# Patient Record
Sex: FEMALE | Race: ASIAN | NOT HISPANIC OR LATINO
[De-identification: names, ages, dates, MRNs, and addresses within clinical notes are randomized per-mention and may not be internally consistent; named-entity substitution may affect disease eponyms.]

---

## 2019-09-09 ENCOUNTER — ASOB RESULT (OUTPATIENT)
Age: 29
End: 2019-09-09

## 2019-09-09 ENCOUNTER — APPOINTMENT (OUTPATIENT)
Dept: OBGYN | Facility: CLINIC | Age: 29
End: 2019-09-09
Payer: MEDICAID

## 2019-09-09 VITALS
SYSTOLIC BLOOD PRESSURE: 116 MMHG | HEIGHT: 64 IN | DIASTOLIC BLOOD PRESSURE: 75 MMHG | BODY MASS INDEX: 24.59 KG/M2 | HEART RATE: 106 BPM | WEIGHT: 144 LBS

## 2019-09-09 DIAGNOSIS — Z82.49 FAMILY HISTORY OF ISCHEMIC HEART DISEASE AND OTHER DISEASES OF THE CIRCULATORY SYSTEM: ICD-10-CM

## 2019-09-09 DIAGNOSIS — Z83.3 FAMILY HISTORY OF DIABETES MELLITUS: ICD-10-CM

## 2019-09-09 DIAGNOSIS — Z92.89 PERSONAL HISTORY OF OTHER MEDICAL TREATMENT: ICD-10-CM

## 2019-09-09 DIAGNOSIS — Z87.59 PERSONAL HISTORY OF OTHER COMPLICATIONS OF PREGNANCY, CHILDBIRTH AND THE PUERPERIUM: ICD-10-CM

## 2019-09-09 PROCEDURE — 76805 OB US >/= 14 WKS SNGL FETUS: CPT

## 2019-09-09 PROCEDURE — 0501F PRENATAL FLOW SHEET: CPT

## 2019-09-10 ENCOUNTER — NON-APPOINTMENT (OUTPATIENT)
Age: 29
End: 2019-09-10

## 2019-09-10 PROBLEM — Z87.59 HISTORY OF SPONTANEOUS ABORTION: Status: RESOLVED | Noted: 2019-09-10 | Resolved: 2019-09-10

## 2019-09-10 PROBLEM — Z92.89 HISTORY OF BLOOD TRANSFUSION: Status: RESOLVED | Noted: 2019-09-10 | Resolved: 2019-09-10

## 2019-09-10 PROBLEM — Z82.49 FAMILY HISTORY OF HYPERTENSION: Status: ACTIVE | Noted: 2019-09-10

## 2019-09-10 PROBLEM — Z83.3 FAMILY HISTORY OF DIABETES MELLITUS: Status: ACTIVE | Noted: 2019-09-10

## 2019-09-10 LAB — BACTERIA UR CULT: NORMAL

## 2019-09-26 ENCOUNTER — APPOINTMENT (OUTPATIENT)
Dept: ANTEPARTUM | Facility: CLINIC | Age: 29
End: 2019-09-26
Payer: MEDICAID

## 2019-09-26 ENCOUNTER — ASOB RESULT (OUTPATIENT)
Age: 29
End: 2019-09-26

## 2019-09-26 ENCOUNTER — OTHER (OUTPATIENT)
Age: 29
End: 2019-09-26

## 2019-09-26 PROCEDURE — 76811 OB US DETAILED SNGL FETUS: CPT

## 2019-10-07 ENCOUNTER — APPOINTMENT (OUTPATIENT)
Dept: OBGYN | Facility: CLINIC | Age: 29
End: 2019-10-07

## 2019-10-09 ENCOUNTER — APPOINTMENT (OUTPATIENT)
Dept: OBGYN | Facility: CLINIC | Age: 29
End: 2019-10-09
Payer: MEDICAID

## 2019-10-09 VITALS
WEIGHT: 147.38 LBS | HEIGHT: 64 IN | SYSTOLIC BLOOD PRESSURE: 121 MMHG | BODY MASS INDEX: 25.16 KG/M2 | DIASTOLIC BLOOD PRESSURE: 75 MMHG

## 2019-10-09 PROCEDURE — 0502F SUBSEQUENT PRENATAL CARE: CPT

## 2019-10-10 ENCOUNTER — NON-APPOINTMENT (OUTPATIENT)
Age: 29
End: 2019-10-10

## 2019-10-30 ENCOUNTER — MEDICATION RENEWAL (OUTPATIENT)
Age: 29
End: 2019-10-30

## 2019-10-31 ENCOUNTER — MEDICATION RENEWAL (OUTPATIENT)
Age: 29
End: 2019-10-31

## 2019-11-04 ENCOUNTER — NON-APPOINTMENT (OUTPATIENT)
Age: 29
End: 2019-11-04

## 2019-11-04 ENCOUNTER — APPOINTMENT (OUTPATIENT)
Dept: OBGYN | Facility: CLINIC | Age: 29
End: 2019-11-04
Payer: MEDICAID

## 2019-11-04 VITALS
WEIGHT: 152 LBS | BODY MASS INDEX: 25.95 KG/M2 | DIASTOLIC BLOOD PRESSURE: 72 MMHG | SYSTOLIC BLOOD PRESSURE: 120 MMHG | HEIGHT: 64 IN

## 2019-11-04 DIAGNOSIS — Z34.92 ENCOUNTER FOR SUPERVISION OF NORMAL PREGNANCY, UNSPECIFIED, SECOND TRIMESTER: ICD-10-CM

## 2019-11-04 PROCEDURE — 0502F SUBSEQUENT PRENATAL CARE: CPT

## 2019-11-05 LAB
BASOPHILS # BLD AUTO: 0.03 K/UL
BASOPHILS NFR BLD AUTO: 0.3 %
EOSINOPHIL # BLD AUTO: 0.12 K/UL
EOSINOPHIL NFR BLD AUTO: 1.1 %
GLUCOSE 1H P 50 G GLC PO SERPL-MCNC: 114 MG/DL
HCT VFR BLD CALC: 31.5 %
HGB BLD-MCNC: 9.6 G/DL
IMM GRANULOCYTES NFR BLD AUTO: 1.2 %
LYMPHOCYTES # BLD AUTO: 1.39 K/UL
LYMPHOCYTES NFR BLD AUTO: 13 %
MAN DIFF?: NORMAL
MCHC RBC-ENTMCNC: 26.5 PG
MCHC RBC-ENTMCNC: 30.5 GM/DL
MCV RBC AUTO: 87 FL
MONOCYTES # BLD AUTO: 0.69 K/UL
MONOCYTES NFR BLD AUTO: 6.5 %
NEUTROPHILS # BLD AUTO: 8.3 K/UL
NEUTROPHILS NFR BLD AUTO: 77.9 %
PLATELET # BLD AUTO: 228 K/UL
RBC # BLD: 3.62 M/UL
RBC # FLD: 13.2 %
T PALLIDUM AB SER QL IA: NEGATIVE
WBC # FLD AUTO: 10.66 K/UL

## 2019-11-08 LAB
ABO + RH PNL BLD: NORMAL
BLD GP AB SCN SERPL QL: NORMAL

## 2019-11-25 ENCOUNTER — APPOINTMENT (OUTPATIENT)
Dept: OBGYN | Facility: CLINIC | Age: 29
End: 2019-11-25
Payer: MEDICAID

## 2019-11-25 VITALS
BODY MASS INDEX: 27.83 KG/M2 | SYSTOLIC BLOOD PRESSURE: 128 MMHG | DIASTOLIC BLOOD PRESSURE: 79 MMHG | HEIGHT: 64 IN | WEIGHT: 163 LBS

## 2019-11-25 DIAGNOSIS — Z34.93 ENCOUNTER FOR SUPERVISION OF NORMAL PREGNANCY, UNSPECIFIED, THIRD TRIMESTER: ICD-10-CM

## 2019-11-25 PROCEDURE — 0502F SUBSEQUENT PRENATAL CARE: CPT

## 2019-12-02 ENCOUNTER — OTHER (OUTPATIENT)
Age: 29
End: 2019-12-02

## 2019-12-13 ENCOUNTER — ASOB RESULT (OUTPATIENT)
Age: 29
End: 2019-12-13

## 2019-12-13 ENCOUNTER — APPOINTMENT (OUTPATIENT)
Dept: OBGYN | Facility: CLINIC | Age: 29
End: 2019-12-13
Payer: MEDICAID

## 2019-12-13 ENCOUNTER — APPOINTMENT (OUTPATIENT)
Dept: ANTEPARTUM | Facility: CLINIC | Age: 29
End: 2019-12-13
Payer: MEDICAID

## 2019-12-13 ENCOUNTER — NON-APPOINTMENT (OUTPATIENT)
Age: 29
End: 2019-12-13

## 2019-12-13 VITALS
DIASTOLIC BLOOD PRESSURE: 73 MMHG | BODY MASS INDEX: 26.67 KG/M2 | SYSTOLIC BLOOD PRESSURE: 122 MMHG | HEIGHT: 64 IN | WEIGHT: 156.25 LBS

## 2019-12-13 PROCEDURE — 0502F SUBSEQUENT PRENATAL CARE: CPT

## 2019-12-13 PROCEDURE — 76816 OB US FOLLOW-UP PER FETUS: CPT

## 2019-12-13 PROCEDURE — 76819 FETAL BIOPHYS PROFIL W/O NST: CPT

## 2019-12-19 ENCOUNTER — OTHER (OUTPATIENT)
Age: 29
End: 2019-12-19

## 2019-12-20 ENCOUNTER — OUTPATIENT (OUTPATIENT)
Dept: INPATIENT UNIT | Facility: HOSPITAL | Age: 29
LOS: 1 days | Discharge: HOME | End: 2019-12-20
Payer: MEDICAID

## 2019-12-20 VITALS
SYSTOLIC BLOOD PRESSURE: 124 MMHG | DIASTOLIC BLOOD PRESSURE: 66 MMHG | RESPIRATION RATE: 17 BRPM | HEART RATE: 92 BPM | TEMPERATURE: 98 F

## 2019-12-20 DIAGNOSIS — Z98.890 OTHER SPECIFIED POSTPROCEDURAL STATES: Chronic | ICD-10-CM

## 2019-12-20 DIAGNOSIS — Z3A.00 WEEKS OF GESTATION OF PREGNANCY NOT SPECIFIED: ICD-10-CM

## 2019-12-20 DIAGNOSIS — O26.899 OTHER SPECIFIED PREGNANCY RELATED CONDITIONS, UNSPECIFIED TRIMESTER: ICD-10-CM

## 2019-12-20 PROCEDURE — 59025 FETAL NON-STRESS TEST: CPT | Mod: 26

## 2019-12-20 NOTE — OB RN TRIAGE NOTE - PMH
Anemia, unspecified type  was On iron pill  History of blood transfusion  S/P PPH 2018, 2 units PRBC as per the  pateint from the torn artery   (normal spontaneous vaginal delivery)  2018 FT F 7lbs, PPH and S/P 2 units PRBC   (normal spontaneous vaginal delivery)  10/3/2014 FT F 6#14   (normal spontaneous vaginal delivery)  3/9/2013 FT F 5#9

## 2019-12-20 NOTE — OB RN TRIAGE NOTE - CHIEF COMPLAINT QUOTE
" I have swelling in my hands and face, and headache from last night" " I have swelling in my hands, feet, and face, and headache from last night"

## 2019-12-20 NOTE — OB RN TRIAGE NOTE - PAIN RATING/NUMBER SCALE (0-10): REST
Dx: Ovarian Cyst, Ca-125=9    Met with Kayli and her significant other Adonay. Discussed her distress thermometer which she rates at 5-6 out of 10. Currently she is self employed as a  & has 3 children. Adonay has 4 children. Together they are worried about finances.     Physically she reports trouble with UTI's over the past year, fatigue as of late, & GI issues over the past year (IBS). She states that she has been tested for celiac disease and was negative. She also has had sleep issues r/t her worry over her current situation. Empathy given.     Family & social hx updated. She then met with Dr. Perdomo.  
6

## 2019-12-21 VITALS — SYSTOLIC BLOOD PRESSURE: 115 MMHG | HEART RATE: 90 BPM | DIASTOLIC BLOOD PRESSURE: 69 MMHG

## 2019-12-21 LAB
ALBUMIN SERPL ELPH-MCNC: 3.2 G/DL — LOW (ref 3.3–5)
ALP SERPL-CCNC: 83 U/L — SIGNIFICANT CHANGE UP (ref 40–120)
ALT FLD-CCNC: 7 U/L — SIGNIFICANT CHANGE UP (ref 4–33)
AMORPH CRY # UR COMP ASSIST: SIGNIFICANT CHANGE UP (ref 0–0)
ANION GAP SERPL CALC-SCNC: 10 MMO/L — SIGNIFICANT CHANGE UP (ref 7–14)
APPEARANCE UR: SIGNIFICANT CHANGE UP
APTT BLD: 28.4 SEC — SIGNIFICANT CHANGE UP (ref 27.5–36.3)
AST SERPL-CCNC: 12 U/L — SIGNIFICANT CHANGE UP (ref 4–32)
BACTERIA # UR AUTO: SIGNIFICANT CHANGE UP
BASOPHILS # BLD AUTO: 0.03 K/UL — SIGNIFICANT CHANGE UP (ref 0–0.2)
BASOPHILS NFR BLD AUTO: 0.3 % — SIGNIFICANT CHANGE UP (ref 0–2)
BILIRUB SERPL-MCNC: 0.4 MG/DL — SIGNIFICANT CHANGE UP (ref 0.2–1.2)
BILIRUB UR-MCNC: NEGATIVE — SIGNIFICANT CHANGE UP
BLOOD UR QL VISUAL: NEGATIVE — SIGNIFICANT CHANGE UP
BUN SERPL-MCNC: 6 MG/DL — LOW (ref 7–23)
CALCIUM SERPL-MCNC: 9.3 MG/DL — SIGNIFICANT CHANGE UP (ref 8.4–10.5)
CHLORIDE SERPL-SCNC: 104 MMOL/L — SIGNIFICANT CHANGE UP (ref 98–107)
CO2 SERPL-SCNC: 20 MMOL/L — LOW (ref 22–31)
COLOR SPEC: SIGNIFICANT CHANGE UP
CREAT ?TM UR-MCNC: 51.7 MG/DL — SIGNIFICANT CHANGE UP
CREAT SERPL-MCNC: 0.37 MG/DL — LOW (ref 0.5–1.3)
EOSINOPHIL # BLD AUTO: 0.29 K/UL — SIGNIFICANT CHANGE UP (ref 0–0.5)
EOSINOPHIL NFR BLD AUTO: 2.5 % — SIGNIFICANT CHANGE UP (ref 0–6)
FIBRINOGEN PPP-MCNC: 652 MG/DL — HIGH (ref 350–510)
GLUCOSE SERPL-MCNC: 91 MG/DL — SIGNIFICANT CHANGE UP (ref 70–99)
GLUCOSE UR-MCNC: NEGATIVE — SIGNIFICANT CHANGE UP
HCT VFR BLD CALC: 29.8 % — LOW (ref 34.5–45)
HGB BLD-MCNC: 9.4 G/DL — LOW (ref 11.5–15.5)
IMM GRANULOCYTES NFR BLD AUTO: 1.1 % — SIGNIFICANT CHANGE UP (ref 0–1.5)
INR BLD: 1 — SIGNIFICANT CHANGE UP (ref 0.88–1.17)
KETONES UR-MCNC: NEGATIVE — SIGNIFICANT CHANGE UP
LDH SERPL L TO P-CCNC: 127 U/L — LOW (ref 135–225)
LEUKOCYTE ESTERASE UR-ACNC: NEGATIVE — SIGNIFICANT CHANGE UP
LYMPHOCYTES # BLD AUTO: 1.68 K/UL — SIGNIFICANT CHANGE UP (ref 1–3.3)
LYMPHOCYTES # BLD AUTO: 14.7 % — SIGNIFICANT CHANGE UP (ref 13–44)
MCHC RBC-ENTMCNC: 25.2 PG — LOW (ref 27–34)
MCHC RBC-ENTMCNC: 31.5 % — LOW (ref 32–36)
MCV RBC AUTO: 79.9 FL — LOW (ref 80–100)
MONOCYTES # BLD AUTO: 0.91 K/UL — HIGH (ref 0–0.9)
MONOCYTES NFR BLD AUTO: 8 % — SIGNIFICANT CHANGE UP (ref 2–14)
NEUTROPHILS # BLD AUTO: 8.36 K/UL — HIGH (ref 1.8–7.4)
NEUTROPHILS NFR BLD AUTO: 73.4 % — SIGNIFICANT CHANGE UP (ref 43–77)
NITRITE UR-MCNC: NEGATIVE — SIGNIFICANT CHANGE UP
NRBC # FLD: 0 K/UL — SIGNIFICANT CHANGE UP (ref 0–0)
PH UR: 6.5 — SIGNIFICANT CHANGE UP (ref 5–8)
PLATELET # BLD AUTO: 228 K/UL — SIGNIFICANT CHANGE UP (ref 150–400)
PMV BLD: 10.1 FL — SIGNIFICANT CHANGE UP (ref 7–13)
POTASSIUM SERPL-MCNC: 4.3 MMOL/L — SIGNIFICANT CHANGE UP (ref 3.5–5.3)
POTASSIUM SERPL-SCNC: 4.3 MMOL/L — SIGNIFICANT CHANGE UP (ref 3.5–5.3)
PROT SERPL-MCNC: 6.7 G/DL — SIGNIFICANT CHANGE UP (ref 6–8.3)
PROT UR-MCNC: 12.4 MG/DL — SIGNIFICANT CHANGE UP
PROT UR-MCNC: NEGATIVE — SIGNIFICANT CHANGE UP
PROTHROM AB SERPL-ACNC: 11.1 SEC — SIGNIFICANT CHANGE UP (ref 9.8–13.1)
RBC # BLD: 3.73 M/UL — LOW (ref 3.8–5.2)
RBC # FLD: 14.7 % — HIGH (ref 10.3–14.5)
RBC CASTS # UR COMP ASSIST: SIGNIFICANT CHANGE UP (ref 0–?)
SODIUM SERPL-SCNC: 134 MMOL/L — LOW (ref 135–145)
SP GR SPEC: 1.01 — SIGNIFICANT CHANGE UP (ref 1–1.04)
SQUAMOUS # UR AUTO: SIGNIFICANT CHANGE UP
URATE SERPL-MCNC: 3.1 MG/DL — SIGNIFICANT CHANGE UP (ref 2.5–7)
UROBILINOGEN FLD QL: NORMAL — SIGNIFICANT CHANGE UP
WBC # BLD: 11.4 K/UL — HIGH (ref 3.8–10.5)
WBC # FLD AUTO: 11.4 K/UL — HIGH (ref 3.8–10.5)
WBC UR QL: SIGNIFICANT CHANGE UP (ref 0–?)

## 2019-12-21 RX ORDER — ACETAMINOPHEN 500 MG
975 TABLET ORAL ONCE
Refills: 0 | Status: COMPLETED | OUTPATIENT
Start: 2019-12-21 | End: 2019-12-21

## 2019-12-21 RX ADMIN — Medication 975 MILLIGRAM(S): at 00:20

## 2019-12-21 NOTE — OB PROVIDER TRIAGE NOTE - HISTORY OF PRESENT ILLNESS
30 yo  female,  33 3/7 weeks with complaints of headache, right sided facial, hand, and feet swelling. Pt denies taking any pain medication. Reports good fetal movements. Denies hx of migraines or elevated bp. Denies pain, lof, vaginal bleeding, visual disturbances, epigastric pain. Denies sob or chest pain. pt states that she had a long day yesterday and was doing a lot of running around.    Current a/p complications: Denies     Allergies: ampicillin- hives  Medications: folic acid, unisom, vit b 12   PMH: Anemia  PSH: D&C  x 1  OB/GYN:  (normal spontaneous vaginal delivery)  2017 FT F 6 lbs7oz, PPH and S/P 2 units PRBC   (normal spontaneous vaginal delivery)  10/3/2014 FT F 6#1   (normal spontaneous vaginal delivery)  3/9/2013 FT F 5#9  sab x 1 w/ d&c  Social: Denies   Psychosocial: Denies

## 2019-12-21 NOTE — OB PROVIDER TRIAGE NOTE - NSHPLABSRESULTS_GEN_ALL_CORE
CBC Full  -  ( 21 Dec 2019 00:30 )  WBC Count : 11.40 K/uL  RBC Count : 3.73 M/uL  Hemoglobin : 9.4 g/dL  Hematocrit : 29.8 %  Platelet Count - Automated : 228 K/uL  Mean Cell Volume : 79.9 fL  Mean Cell Hemoglobin : 25.2 pg  Mean Cell Hemoglobin Concentration : 31.5 %  Auto Neutrophil # : 8.36 K/uL  Auto Lymphocyte # : 1.68 K/uL  Auto Monocyte # : 0.91 K/uL  Auto Eosinophil # : 0.29 K/uL  Auto Basophil # : 0.03 K/uL  Auto Neutrophil % : 73.4 %  Auto Lymphocyte % : 14.7 %  Auto Monocyte % : 8.0 %  Auto Eosinophil % : 2.5 %  Auto Basophil % : 0.3 %        134<L>  |  104  |  6<L>  ----------------------------<  91  4.3   |  20<L>  |  0.37<L>    Ca    9.3      21 Dec 2019 00:30    TPro  6.7  /  Alb  3.2<L>  /  TBili  0.4  /  DBili  x   /  AST  12  /  ALT  7   /  AlkPhos  83  12-    uric acid 3.1    ldh 127     PT/INR - ( 21 Dec 2019 00:30 )   PT: 11.1 SEC;   INR: 1.00          PTT - ( 21 Dec 2019 00:30 )  PTT:28.4 SEC    fibrinogen 652     Urinalysis Basic - ( 21 Dec 2019 00:30 )    Color: LIGHT YELLOW / Appearance: Lt TURBID / S.015 / pH: 6.5  Gluc: NEGATIVE / Ketone: NEGATIVE  / Bili: NEGATIVE / Urobili: NORMAL   Blood: NEGATIVE / Protein: NEGATIVE / Nitrite: NEGATIVE   Leuk Esterase: NEGATIVE / RBC: 0-2 / WBC 3-5   Sq Epi: FEW / Non Sq Epi: x / Bacteria: FEW    pcr 0.24

## 2019-12-21 NOTE — OB PROVIDER TRIAGE NOTE - NS_OBGYNHISTORY_OBGYN_ALL_OB_FT
OB/GYN:  (normal spontaneous vaginal delivery)  2017 FT F 6 lbs7oz, PPH and S/P 2 units PRBC   (normal spontaneous vaginal delivery)  10/3/2014 FT F 6#1   (normal spontaneous vaginal delivery)  3/9/2013 FT F 5#9  sab x 1 w/ d&c

## 2019-12-21 NOTE — OB PROVIDER TRIAGE NOTE - NSHPPHYSICALEXAM_GEN_ALL_CORE
Vital Signs Last 24 Hrs  T(C): 36.6 (20 Dec 2019 23:54), Max: 36.8 (20 Dec 2019 21:36)  T(F): 97.88 (20 Dec 2019 23:54), Max: 98.2 (20 Dec 2019 21:36)  HR: 84 (21 Dec 2019 01:53) (79 - 92)  BP: 119/71 (21 Dec 2019 01:53) (110/64 - 124/66)  RR: 17 (20 Dec 2019 21:36) (17 - 17)      Abdomen soft, nontender  skin intact, warm and dry, no swelling noted     Cat 1 tracing   no ctx by toco     TAS cephalic presentation, anterior placenta, gracie 10.01 cm, bpp 8/8     hellp labs pending     serial bps

## 2019-12-21 NOTE — OB PROVIDER TRIAGE NOTE - NSOBPROVIDERNOTE_OBGYN_ALL_OB_FT
No evidence of preeclampsia   labs wnl  pt reports headache has resolved     D/w Dr. Mauricio   -Discharge home   -f/u in 1 week   -fetal kick count reviewed   -warning signs reviewed

## 2019-12-23 ENCOUNTER — APPOINTMENT (OUTPATIENT)
Dept: OBGYN | Facility: CLINIC | Age: 29
End: 2019-12-23

## 2019-12-23 VITALS — HEIGHT: 64 IN

## 2019-12-26 PROBLEM — D64.9 ANEMIA, UNSPECIFIED: Chronic | Status: ACTIVE | Noted: 2019-12-20

## 2019-12-26 PROBLEM — Z92.89 PERSONAL HISTORY OF OTHER MEDICAL TREATMENT: Chronic | Status: ACTIVE | Noted: 2019-12-20

## 2019-12-30 ENCOUNTER — TRANSCRIPTION ENCOUNTER (OUTPATIENT)
Age: 29
End: 2019-12-30

## 2019-12-30 ENCOUNTER — APPOINTMENT (OUTPATIENT)
Dept: OBGYN | Facility: CLINIC | Age: 29
End: 2019-12-30

## 2019-12-30 ENCOUNTER — INPATIENT (INPATIENT)
Facility: HOSPITAL | Age: 29
LOS: 1 days | Discharge: HOME | End: 2020-01-01
Attending: OBSTETRICS & GYNECOLOGY | Admitting: OBSTETRICS & GYNECOLOGY
Payer: MEDICAID

## 2019-12-30 VITALS
DIASTOLIC BLOOD PRESSURE: 69 MMHG | HEART RATE: 94 BPM | RESPIRATION RATE: 18 BRPM | SYSTOLIC BLOOD PRESSURE: 132 MMHG | TEMPERATURE: 99 F

## 2019-12-30 DIAGNOSIS — Z98.890 OTHER SPECIFIED POSTPROCEDURAL STATES: Chronic | ICD-10-CM

## 2019-12-30 DIAGNOSIS — Z3A.00 WEEKS OF GESTATION OF PREGNANCY NOT SPECIFIED: ICD-10-CM

## 2019-12-30 DIAGNOSIS — O26.899 OTHER SPECIFIED PREGNANCY RELATED CONDITIONS, UNSPECIFIED TRIMESTER: ICD-10-CM

## 2019-12-30 LAB
AMORPH CRY # UR COMP ASSIST: SIGNIFICANT CHANGE UP (ref 0–0)
APPEARANCE UR: SIGNIFICANT CHANGE UP
BACTERIA # UR AUTO: NEGATIVE — SIGNIFICANT CHANGE UP
BASOPHILS # BLD AUTO: 0.03 K/UL — SIGNIFICANT CHANGE UP (ref 0–0.2)
BASOPHILS NFR BLD AUTO: 0.2 % — SIGNIFICANT CHANGE UP (ref 0–2)
BILIRUB UR-MCNC: NEGATIVE — SIGNIFICANT CHANGE UP
BLD GP AB SCN SERPL QL: NEGATIVE — SIGNIFICANT CHANGE UP
BLOOD UR QL VISUAL: SIGNIFICANT CHANGE UP
COLOR SPEC: SIGNIFICANT CHANGE UP
EOSINOPHIL # BLD AUTO: 0.19 K/UL — SIGNIFICANT CHANGE UP (ref 0–0.5)
EOSINOPHIL NFR BLD AUTO: 1.5 % — SIGNIFICANT CHANGE UP (ref 0–6)
GLUCOSE UR-MCNC: NEGATIVE — SIGNIFICANT CHANGE UP
GRAN CASTS # UR COMP ASSIST: SIGNIFICANT CHANGE UP
HBV SURFACE AG SER-ACNC: NEGATIVE — SIGNIFICANT CHANGE UP
HCT VFR BLD CALC: 30.9 % — LOW (ref 34.5–45)
HGB BLD-MCNC: 9.6 G/DL — LOW (ref 11.5–15.5)
HIV COMBO RESULT: SIGNIFICANT CHANGE UP
HIV1+2 AB SPEC QL: SIGNIFICANT CHANGE UP
HYALINE CASTS # UR AUTO: NEGATIVE — SIGNIFICANT CHANGE UP
IMM GRANULOCYTES NFR BLD AUTO: 1.4 % — SIGNIFICANT CHANGE UP (ref 0–1.5)
KETONES UR-MCNC: NEGATIVE — SIGNIFICANT CHANGE UP
LEUKOCYTE ESTERASE UR-ACNC: NEGATIVE — SIGNIFICANT CHANGE UP
LYMPHOCYTES # BLD AUTO: 1.69 K/UL — SIGNIFICANT CHANGE UP (ref 1–3.3)
LYMPHOCYTES # BLD AUTO: 13.6 % — SIGNIFICANT CHANGE UP (ref 13–44)
MCHC RBC-ENTMCNC: 25.2 PG — LOW (ref 27–34)
MCHC RBC-ENTMCNC: 31.1 % — LOW (ref 32–36)
MCV RBC AUTO: 81.1 FL — SIGNIFICANT CHANGE UP (ref 80–100)
MONOCYTES # BLD AUTO: 0.97 K/UL — HIGH (ref 0–0.9)
MONOCYTES NFR BLD AUTO: 7.8 % — SIGNIFICANT CHANGE UP (ref 2–14)
NEUTROPHILS # BLD AUTO: 9.34 K/UL — HIGH (ref 1.8–7.4)
NEUTROPHILS NFR BLD AUTO: 75.5 % — SIGNIFICANT CHANGE UP (ref 43–77)
NITRITE UR-MCNC: NEGATIVE — SIGNIFICANT CHANGE UP
NRBC # FLD: 0 K/UL — SIGNIFICANT CHANGE UP (ref 0–0)
PH UR: 7 — SIGNIFICANT CHANGE UP (ref 5–8)
PLATELET # BLD AUTO: 226 K/UL — SIGNIFICANT CHANGE UP (ref 150–400)
PMV BLD: 9.8 FL — SIGNIFICANT CHANGE UP (ref 7–13)
PROT UR-MCNC: 10 — SIGNIFICANT CHANGE UP
RBC # BLD: 3.81 M/UL — SIGNIFICANT CHANGE UP (ref 3.8–5.2)
RBC # FLD: 15.3 % — HIGH (ref 10.3–14.5)
RBC CASTS # UR COMP ASSIST: HIGH (ref 0–?)
RENAL EPI CELLS # UR COMP ASSIST: SIGNIFICANT CHANGE UP
RH IG SCN BLD-IMP: POSITIVE — SIGNIFICANT CHANGE UP
RUBV IGG SER-ACNC: 3.9 INDEX — SIGNIFICANT CHANGE UP
RUBV IGG SER-IMP: POSITIVE — SIGNIFICANT CHANGE UP
SP GR SPEC: 1.02 — SIGNIFICANT CHANGE UP (ref 1–1.04)
SPECIMEN SOURCE: SIGNIFICANT CHANGE UP
SQUAMOUS # UR AUTO: SIGNIFICANT CHANGE UP
T PALLIDUM AB TITR SER: NEGATIVE — SIGNIFICANT CHANGE UP
UROBILINOGEN FLD QL: NORMAL — SIGNIFICANT CHANGE UP
WBC # BLD: 12.39 K/UL — HIGH (ref 3.8–10.5)
WBC # FLD AUTO: 12.39 K/UL — HIGH (ref 3.8–10.5)
WBC UR QL: SIGNIFICANT CHANGE UP (ref 0–?)

## 2019-12-30 PROCEDURE — 59400 OBSTETRICAL CARE: CPT | Mod: U7,UB,GC

## 2019-12-30 RX ORDER — DOXYLAMINE SUCCINATE 25 MG
1 TABLET ORAL
Qty: 0 | Refills: 0 | DISCHARGE

## 2019-12-30 RX ORDER — LANOLIN
1 OINTMENT (GRAM) TOPICAL EVERY 6 HOURS
Refills: 0 | Status: DISCONTINUED | OUTPATIENT
Start: 2019-12-30 | End: 2020-01-01

## 2019-12-30 RX ORDER — CEFAZOLIN SODIUM 1 G
1000 VIAL (EA) INJECTION EVERY 8 HOURS
Refills: 0 | Status: DISCONTINUED | OUTPATIENT
Start: 2019-12-30 | End: 2019-12-30

## 2019-12-30 RX ORDER — SODIUM CHLORIDE 9 MG/ML
1000 INJECTION, SOLUTION INTRAVENOUS
Refills: 0 | Status: DISCONTINUED | OUTPATIENT
Start: 2019-12-30 | End: 2019-12-30

## 2019-12-30 RX ORDER — DIPHENHYDRAMINE HCL 50 MG
25 CAPSULE ORAL EVERY 6 HOURS
Refills: 0 | Status: DISCONTINUED | OUTPATIENT
Start: 2019-12-30 | End: 2019-12-30

## 2019-12-30 RX ORDER — IBUPROFEN 200 MG
600 TABLET ORAL EVERY 6 HOURS
Refills: 0 | Status: COMPLETED | OUTPATIENT
Start: 2019-12-30 | End: 2020-11-27

## 2019-12-30 RX ORDER — FOLIC ACID 0.8 MG
1 TABLET ORAL
Qty: 0 | Refills: 0 | DISCHARGE

## 2019-12-30 RX ORDER — ACETAMINOPHEN 500 MG
975 TABLET ORAL
Refills: 0 | Status: DISCONTINUED | OUTPATIENT
Start: 2019-12-30 | End: 2020-01-01

## 2019-12-30 RX ORDER — VANCOMYCIN HCL 1 G
1000 VIAL (EA) INTRAVENOUS EVERY 12 HOURS
Refills: 0 | Status: DISCONTINUED | OUTPATIENT
Start: 2019-12-30 | End: 2019-12-30

## 2019-12-30 RX ORDER — DIBUCAINE 1 %
1 OINTMENT (GRAM) RECTAL EVERY 6 HOURS
Refills: 0 | Status: DISCONTINUED | OUTPATIENT
Start: 2019-12-30 | End: 2020-01-01

## 2019-12-30 RX ORDER — OXYTOCIN 10 UNIT/ML
333.33 VIAL (ML) INJECTION
Qty: 20 | Refills: 0 | Status: DISCONTINUED | OUTPATIENT
Start: 2019-12-30 | End: 2020-01-01

## 2019-12-30 RX ORDER — CEFAZOLIN SODIUM 1 G
VIAL (EA) INJECTION
Refills: 0 | Status: DISCONTINUED | OUTPATIENT
Start: 2019-12-30 | End: 2019-12-30

## 2019-12-30 RX ORDER — ONDANSETRON 8 MG/1
4 TABLET, FILM COATED ORAL ONCE
Refills: 0 | Status: COMPLETED | OUTPATIENT
Start: 2019-12-30 | End: 2019-12-30

## 2019-12-30 RX ORDER — TETANUS TOXOID, REDUCED DIPHTHERIA TOXOID AND ACELLULAR PERTUSSIS VACCINE, ADSORBED 5; 2.5; 8; 8; 2.5 [IU]/.5ML; [IU]/.5ML; UG/.5ML; UG/.5ML; UG/.5ML
0.5 SUSPENSION INTRAMUSCULAR ONCE
Refills: 0 | Status: DISCONTINUED | OUTPATIENT
Start: 2019-12-30 | End: 2020-01-01

## 2019-12-30 RX ORDER — OXYTOCIN 10 UNIT/ML
VIAL (ML) INJECTION
Qty: 20 | Refills: 0 | Status: DISCONTINUED | OUTPATIENT
Start: 2019-12-30 | End: 2020-01-01

## 2019-12-30 RX ORDER — VANCOMYCIN HCL 1 G
VIAL (EA) INTRAVENOUS
Refills: 0 | Status: DISCONTINUED | OUTPATIENT
Start: 2019-12-30 | End: 2019-12-30

## 2019-12-30 RX ORDER — IBUPROFEN 200 MG
600 TABLET ORAL EVERY 6 HOURS
Refills: 0 | Status: DISCONTINUED | OUTPATIENT
Start: 2019-12-30 | End: 2020-01-01

## 2019-12-30 RX ORDER — OXYCODONE HYDROCHLORIDE 5 MG/1
5 TABLET ORAL
Refills: 0 | Status: DISCONTINUED | OUTPATIENT
Start: 2019-12-30 | End: 2020-01-01

## 2019-12-30 RX ORDER — AER TRAVELER 0.5 G/1
1 SOLUTION RECTAL; TOPICAL EVERY 4 HOURS
Refills: 0 | Status: DISCONTINUED | OUTPATIENT
Start: 2019-12-30 | End: 2020-01-01

## 2019-12-30 RX ORDER — ACETAMINOPHEN 500 MG
3 TABLET ORAL
Qty: 0 | Refills: 0 | DISCHARGE
Start: 2019-12-30

## 2019-12-30 RX ORDER — DIPHENHYDRAMINE HCL 50 MG
25 CAPSULE ORAL EVERY 4 HOURS
Refills: 0 | Status: DISCONTINUED | OUTPATIENT
Start: 2019-12-30 | End: 2020-01-01

## 2019-12-30 RX ORDER — BENZOCAINE 10 %
1 GEL (GRAM) MUCOUS MEMBRANE EVERY 6 HOURS
Refills: 0 | Status: DISCONTINUED | OUTPATIENT
Start: 2019-12-30 | End: 2020-01-01

## 2019-12-30 RX ORDER — MAGNESIUM HYDROXIDE 400 MG/1
30 TABLET, CHEWABLE ORAL
Refills: 0 | Status: DISCONTINUED | OUTPATIENT
Start: 2019-12-30 | End: 2020-01-01

## 2019-12-30 RX ORDER — IBUPROFEN 200 MG
1 TABLET ORAL
Qty: 0 | Refills: 0 | DISCHARGE
Start: 2019-12-30

## 2019-12-30 RX ORDER — PRAMOXINE HYDROCHLORIDE 150 MG/15G
1 AEROSOL, FOAM RECTAL EVERY 4 HOURS
Refills: 0 | Status: DISCONTINUED | OUTPATIENT
Start: 2019-12-30 | End: 2020-01-01

## 2019-12-30 RX ORDER — VANCOMYCIN HCL 1 G
1000 VIAL (EA) INTRAVENOUS ONCE
Refills: 0 | Status: DISCONTINUED | OUTPATIENT
Start: 2019-12-30 | End: 2019-12-30

## 2019-12-30 RX ORDER — GLYCERIN ADULT
1 SUPPOSITORY, RECTAL RECTAL AT BEDTIME
Refills: 0 | Status: DISCONTINUED | OUTPATIENT
Start: 2019-12-30 | End: 2020-01-01

## 2019-12-30 RX ORDER — HYDROCORTISONE 1 %
1 OINTMENT (GRAM) TOPICAL EVERY 6 HOURS
Refills: 0 | Status: DISCONTINUED | OUTPATIENT
Start: 2019-12-30 | End: 2020-01-01

## 2019-12-30 RX ORDER — CEFAZOLIN SODIUM 1 G
2000 VIAL (EA) INJECTION ONCE
Refills: 0 | Status: COMPLETED | OUTPATIENT
Start: 2019-12-30 | End: 2019-12-30

## 2019-12-30 RX ORDER — OXYCODONE HYDROCHLORIDE 5 MG/1
5 TABLET ORAL ONCE
Refills: 0 | Status: DISCONTINUED | OUTPATIENT
Start: 2019-12-30 | End: 2020-01-01

## 2019-12-30 RX ORDER — SODIUM CHLORIDE 9 MG/ML
3 INJECTION INTRAMUSCULAR; INTRAVENOUS; SUBCUTANEOUS EVERY 8 HOURS
Refills: 0 | Status: DISCONTINUED | OUTPATIENT
Start: 2019-12-30 | End: 2020-01-01

## 2019-12-30 RX ORDER — KETOROLAC TROMETHAMINE 30 MG/ML
30 SYRINGE (ML) INJECTION ONCE
Refills: 0 | Status: DISCONTINUED | OUTPATIENT
Start: 2019-12-30 | End: 2019-12-30

## 2019-12-30 RX ORDER — SIMETHICONE 80 MG/1
80 TABLET, CHEWABLE ORAL EVERY 4 HOURS
Refills: 0 | Status: DISCONTINUED | OUTPATIENT
Start: 2019-12-30 | End: 2020-01-01

## 2019-12-30 RX ADMIN — Medication 1000 MILLIUNIT(S)/MIN: at 09:22

## 2019-12-30 RX ADMIN — SODIUM CHLORIDE 3 MILLILITER(S): 9 INJECTION INTRAMUSCULAR; INTRAVENOUS; SUBCUTANEOUS at 16:45

## 2019-12-30 RX ADMIN — SODIUM CHLORIDE 125 MILLILITER(S): 9 INJECTION, SOLUTION INTRAVENOUS at 03:27

## 2019-12-30 RX ADMIN — Medication 600 MILLIGRAM(S): at 23:32

## 2019-12-30 RX ADMIN — Medication 600 MILLIGRAM(S): at 18:46

## 2019-12-30 RX ADMIN — Medication 30 MILLIGRAM(S): at 10:29

## 2019-12-30 RX ADMIN — Medication 100 MILLIGRAM(S): at 03:39

## 2019-12-30 RX ADMIN — ONDANSETRON 4 MILLIGRAM(S): 8 TABLET, FILM COATED ORAL at 05:07

## 2019-12-30 RX ADMIN — Medication 25 MILLIGRAM(S): at 09:49

## 2019-12-30 RX ADMIN — Medication 12 MILLIGRAM(S): at 03:27

## 2019-12-30 NOTE — DISCHARGE NOTE OB - HOSPITAL COURSE
28 y/o P3 @ 34+ weeks admitted in  labor. Delivered viable male infant via , stable to NICU. Postpartum course uncomplicated.

## 2019-12-30 NOTE — CHART NOTE - NSCHARTNOTEFT_GEN_A_CORE
R2 Labor Note    Patient evaluated for progress  SVE 9/100/0, palpable bag   [place] q2-3m   /mod haydee/accels+/no decels    A/P: 29y P3 admitted for pre term labor   -Labor: Continue with expectant management. s/p BMZ for fetal lung maturity   -Fetus: cat I, continous EFM   -GBS unk, Vanc for GBS ppx   -Analgesia: epidural, effective    Esra Gisselle, PGY2  d/w Dr. Bolton

## 2019-12-30 NOTE — DISCHARGE NOTE OB - MATERIALS PROVIDED
Back To Sleep Handout/[facility]  Screening Program/Breastfeeding Guide and Packet/Birth Certificate Instructions/Vaccinations/[facility] Hearing Screen Program/Shaken Baby Prevention Handout/Tdap Vaccination (VIS Pub Date: 2012)/  Immunization Record/Breastfeeding Log/Breastfeeding Mother’s Support Group Information/Guide to Postpartum Care

## 2019-12-30 NOTE — OB NEONATOLOGY/PEDIATRICIAN DELIVERY SUMMARY - NSPEDSNEONOTESA_OBGYN_ALL_OB_FT
Peds called to delivery for prematurity. 34.5 weeker male born to a 29 yo  mother via . No maternal or prenatal complications. Mother received BMZ x1.  PNL negative HIV, Rubella & RPR pending. GBS unknown. ROM at time of delivery. APGARs 9/9. Required nCPAP 5/21% for grunting and retractions at 12 MOL. Baby transferred to NICU for further management.     Parents would like to breastfeed and bottle feed. Deferring Hepatitis B to PMD (Cristiano Holguin). Family would like Circumcision prior to discharge. Peds called to delivery for prematurity. 34.5 weeker male born to a 31 yo  mother via . No maternal or prenatal complications. Mother received BMZ x1.  PNL negative HIV, Rubella & RPR pending. GBS unknown. ROM at time of delivery. Baby emerged vigorous with spontaneous cry. DCC X 60 seconds.  W/D/S/S. APGARs 9/9. Required nCPAP 5/21% for grunting and retractions at 12 MOL. Baby transferred to NICU for further management.     Parents would like to breastfeed and bottle feed. Deferring Hepatitis B to PMD (Cristiano Holguin). Family would like Circumcision prior to discharge.

## 2019-12-30 NOTE — OB PROVIDER TRIAGE NOTE - HISTORY OF PRESENT ILLNESS
Dr. Bardales's pt. is a 28y/o EGA 34.5wks  pt. reports of painful contractions since 10pm every 5-7mins. Pain 10/10. Pt. denies LOF and VB. Pt. reports FM.

## 2019-12-30 NOTE — OB RN DELIVERY SUMMARY - NS_SEPSISRSKCALC_OBGYN_ALL_OB_FT
EOS calculated successfully. EOS Risk Factor: 0.5/1000 live births ([facility] national incidence); GA=34w5d; Temp=98.7; ROM=0.017; GBS='Unknown'; Antibiotics='No antibiotics or any antibiotics < 2 hrs prior to birth'

## 2019-12-30 NOTE — DISCHARGE NOTE OB - PATIENT PORTAL LINK FT
You can access the FollowMyHealth Patient Portal offered by [facility] by registering at the following website: [email or web address]. By joining [facility]’s FollowMyHealth portal, you will also be able to view your health information using other applications (apps) compatible with our system.

## 2019-12-30 NOTE — OB PROVIDER H&P - HISTORY OF PRESENT ILLNESS
Dr. Bardales's pt. is a 30y/o EGA 34.5wks  pt. reports of painful contractions since 10pm every 5-7mins. Pain 10/10. Pt. denies LOF and VB. Pt. reports FM.

## 2019-12-30 NOTE — DISCHARGE NOTE OB - CARE PROVIDER_API CALL
Morena Bardales (MD)  Obstetrics and Gynecology  [facility] [place] [address]  Phone: [phone]  Fax: [phone]  Follow Up Time:

## 2019-12-30 NOTE — OB RN TRIAGE NOTE - NS_TRIAGETIMEOF NOTIFICATION_OBGYN_ALL_OB_DT
Patient drowsy at this time, unable complete sentences. Answers questions inappropriately. Pain medication withheld, pain service aware and at bedside. Patient falling asleep during conversation, does awake to name. VSS- on 2L 02 NC.      Discussed case w 30-Dec-2019 02:52

## 2019-12-30 NOTE — OB PROVIDER TRIAGE NOTE - NSOBPROVIDERNOTE_OBGYN_ALL_OB_FT
Dr. Bardales's pt. is a 30y/o EGA 34.5wks  pt. reports of painful contractions since 10pm every 5-7mins. Pain 10/10. Pt. denies LOF and VB. Pt. reports FM.     AP: Denies  Medical Hx: Anemia  Surgical Hx: D&C   OBGYN Hx:   FT  3/9/2013 5-9  FT  10/3/2014 6-14  SABx1 with D&C   FT  2018 7-0 (PPH received 2units PRBC as per pt. for a "torn artery")   Meds: Unisom, B12, PNV  Allergies: Ampicillin-Rash    Assessment/Plan  SVE: /-2  TAS: Cephalic presentation  EFW: 2500gms by leopolds  FHR: 145bpm, moderate variability, accels, no decels   [place] every 6-7mins     Evidence of  labor. Discussed findings with Dr. Bolton.  -Admit to L&D  -Routine labs, UA, UC, and GBS ordered   -Betamethasone for fetal lung maturity  -Vancomycin for GBS unknown  -For epidural

## 2019-12-30 NOTE — OB PROVIDER TRIAGE NOTE - NSHPPHYSICALEXAM_GEN_ALL_CORE
SVE: 4/80/-2  TAS: Cephalic presentation  EFW: 2500gms by leopolds  FHR: 145bpm, moderate variability, accels, no decels   Chelsea every 6-7mins

## 2019-12-30 NOTE — OB RN DELIVERY SUMMARY - NS_ADMITLABOR_OBGYN_ALL_OB
[facility]  [address] [id number] [phone]            Abundio Beckett  [address]   [facility] [address]        September 11, 2018    Dear Abundio,    While refilling your prescription today, we noticed that you are due to have labs drawn.  We will refill your prescription for 30 days, but a follow-up appointment must be made before any additional refills can be approved.     Taking care of your health is important to us and we look forward to seeing you in the near future.  Please call us at [phone] or [phone] (or use [employer]) to schedule an appointment.     Please disregard this notice if you have already made an appointment.    Sincerely,        [facility]  
Yes

## 2019-12-30 NOTE — OB PROVIDER H&P - ASSESSMENT
Dr. Bardales's pt. is a 28y/o EGA 34.5wks  pt. reports of painful contractions since 10pm every 5-7mins. Pain 10/10. Pt. denies LOF and VB. Pt. reports FM.     AP: Denies  Medical Hx: Anemia  Surgical Hx: D&C   OBGYN Hx:   FT  3/9/2013 5-9  FT  10/3/2014 6-14  SABx1 with D&C   FT  2018 7-0 (PPH received 2units PRBC as per pt. for a "torn artery")   Meds: Unisom, B12, PNV  Allergies: Ampicillin-Rash    Assessment/Plan  SVE: /-2  TAS: Cephalic presentation  EFW: 2500gms by leopolds  FHR: 145bpm, moderate variability, accels, no decels   [place] every 6-7mins     Evidence of  labor. Discussed findings with Dr. Bolton.  -Admit to L&D  -Routine labs, UA, UC, and GBS ordered   -Betamethasone for fetal lung maturity  -Vancomycin for GBS unknown  -For epidural

## 2019-12-30 NOTE — DISCHARGE NOTE OB - CARE PLAN
Principal Discharge DX:	 (normal spontaneous vaginal delivery)  Goal:	recovery  Assessment and plan of treatment:	no change

## 2019-12-30 NOTE — CHART NOTE - NSCHARTNOTEFT_GEN_A_CORE
OB Attg  Patient examined, VE: 10/100/+1, membranes intact  Recently received additional medication and feeling comfortable.   Discussed AROM with next exam if feeling pressure. Discussed peds present at delivery and NICU admission.   Agustina Nice MD

## 2019-12-30 NOTE — CHART NOTE - NSCHARTNOTEFT_GEN_A_CORE
R1 OB Progress Note    Patient seen and evaluated at bedside.  Denies complaints.  Comfortable w/ epidural.      T(C): 36.7 (12-30-19 @ 04:00), Max: 37.1 (12-30-19 @ 02:46)  HR: 102 (12-30-19 @ 04:12) (94 - 131)  BP: 129/60 (12-30-19 @ 04:12) (122/55 - 149/85)  RR: 16 (12-30-19 @ 04:00) (16 - 18)  SpO2: 100% (12-30-19 @ 04:01) (100% - 100%)    SVE: 6/90/-3  EFM: 135, mod haydee, + accels, - decels BECKI  [place]:  irregular    A/P 29y P3 admitted for pre term labor   -Labor: Expectant management. Betamethasone for fetal lung maturity  -Fetus: BECKI  -GBS unk, Vanc for prophyylaxis  -Analgesia: epidural    Geovany Vernon PGY1  d/w

## 2019-12-31 LAB — BACTERIA UR CULT: SIGNIFICANT CHANGE UP

## 2019-12-31 RX ADMIN — SODIUM CHLORIDE 3 MILLILITER(S): 9 INJECTION INTRAMUSCULAR; INTRAVENOUS; SUBCUTANEOUS at 14:00

## 2019-12-31 RX ADMIN — Medication 600 MILLIGRAM(S): at 06:00

## 2019-12-31 RX ADMIN — Medication 600 MILLIGRAM(S): at 13:21

## 2019-12-31 RX ADMIN — Medication 600 MILLIGRAM(S): at 20:50

## 2019-12-31 RX ADMIN — Medication 600 MILLIGRAM(S): at 14:00

## 2019-12-31 RX ADMIN — Medication 600 MILLIGRAM(S): at 19:53

## 2019-12-31 NOTE — PROGRESS NOTE ADULT - SUBJECTIVE AND OBJECTIVE BOX
Anesthesia Post-op Note    POD#1 S/P vaginal delivery.    Patient out of room to NICU. As per FADIA Benson, patient is doing well.  OOBAA. Tolerating clears.  Pain is tolerable.  No residual anesthetic issues or complications noted.

## 2019-12-31 NOTE — PROGRESS NOTE ADULT - SUBJECTIVE AND OBJECTIVE BOX
S: Patient doing well. Minimal lochia. Pain controlled. Ambulating, doing well, no complaints    O: Vital Signs Last 24 Hrs  T(C): 37 (31 Dec 2019 06:56), Max: 37 (31 Dec 2019 06:56)  T(F): 98.6 (31 Dec 2019 06:56), Max: 98.6 (31 Dec 2019 06:56)  HR: 85 (31 Dec 2019 06:56) (82 - 90)  BP: 124/62 (31 Dec 2019 06:56) (124/62 - 130/62)  BP(mean): --  RR: 19 (31 Dec 2019 06:56) (17 - 19)  SpO2: 99% (31 Dec 2019 06:56) (99% - 100%)    Gen: NAD  Abd: soft, NT, ND, fundus firm below umbilicus  Lochia: moderate  Ext: no tenderness    Labs:                        9.6    12.39 )-----------( 226      ( 30 Dec 2019 03:20 )             30.9       A: 29y PPD# 1 s/p  doing well.    Plan:  Routine postpartum care  Discharge planning

## 2020-01-01 VITALS
SYSTOLIC BLOOD PRESSURE: 115 MMHG | HEART RATE: 73 BPM | RESPIRATION RATE: 18 BRPM | TEMPERATURE: 98 F | DIASTOLIC BLOOD PRESSURE: 66 MMHG | OXYGEN SATURATION: 99 %

## 2020-01-01 RX ADMIN — Medication 1 TABLET(S): at 12:25

## 2020-01-01 RX ADMIN — Medication 600 MILLIGRAM(S): at 12:25

## 2020-01-01 RX ADMIN — Medication 600 MILLIGRAM(S): at 07:40

## 2020-01-01 RX ADMIN — Medication 600 MILLIGRAM(S): at 06:54

## 2020-01-06 ENCOUNTER — APPOINTMENT (OUTPATIENT)
Dept: OBGYN | Facility: CLINIC | Age: 30
End: 2020-01-06

## 2020-01-13 ENCOUNTER — APPOINTMENT (OUTPATIENT)
Dept: OBGYN | Facility: CLINIC | Age: 30
End: 2020-01-13

## 2020-01-23 ENCOUNTER — APPOINTMENT (OUTPATIENT)
Dept: OBGYN | Facility: CLINIC | Age: 30
End: 2020-01-23

## 2020-01-24 NOTE — OB PROVIDER DELIVERY SUMMARY - NSPROVIDERDELIVERYNOTE_OBGYN_ALL_OB_FT
Viable male infant delivered via . Delivery imminent, decision made to rupture membranes. 1 dose of Betamethasone given prior. Peds called for delivery. Spontaneous cry, delayed cord clamping performed.

## 2020-01-30 NOTE — OB RN DELIVERY SUMMARY - NSVAGDELIVERYA_OBGYN_ALL_OB
Subjective:       Patient ID: Shanti Hartman is a 11 m.o. female.    Vitals:  height is 2' (0.61 m) and weight is 9.979 kg (22 lb). Her tympanic temperature is 98.8 °F (37.1 °C). Her pulse is 99. Her oxygen saturation is 98%.     Chief Complaint: Sinus Problem    11 month old female new to me presents for f/up. Seen over the weekend with 101 temp. Dx with ear infection and treated with amoxil. Reports fever is better, but concerned about chest congestion. Playing, just doesn't have much of an appetite. Dad dx with flu today.     Sinus Problem   This is a new problem. The current episode started in the past 7 days. The problem has been gradually worsening since onset. There has been no fever. Associated symptoms include congestion, coughing and sinus pressure. Pertinent negatives include no chills, diaphoresis, ear pain, headaches, shortness of breath, sneezing or sore throat. Past treatments include antibiotics. The treatment provided mild relief.       Constitution: Positive for fever (improved from weekend). Negative for appetite change, chills, sweating and fatigue.   HENT: Positive for congestion, postnasal drip and sinus pressure. Negative for ear pain, nosebleeds, foreign body in nose, sinus pain, sore throat, trouble swallowing and voice change.    Neck: Negative for painful lymph nodes.   Respiratory: Positive for cough and wheezing. Negative for sputum production, shortness of breath, stridor and asthma.    Gastrointestinal: Negative for abdominal pain, nausea, vomiting, constipation and diarrhea.   Musculoskeletal: Negative for muscle ache.   Skin: Negative for rash.   Allergic/Immunologic: Negative for asthma and sneezing.   Neurological: Negative for headaches and seizures.   Hematologic/Lymphatic: Negative for swollen lymph nodes.       Objective:      Physical Exam   Constitutional: She appears well-developed and well-nourished. She is active. No distress.   HENT:   Head: Normocephalic and atraumatic.  Anterior fontanelle is flat. No hematoma. No signs of injury.   Right Ear: External ear, pinna and canal normal.   Left Ear: Tympanic membrane, external ear, pinna and canal normal.   Nose: Nose normal. No rhinorrhea or nasal discharge. No signs of injury.   Mouth/Throat: Mucous membranes are moist. Oropharynx is clear.   Right ++ erythema, mild pink to left. Thick pnd noted.    Eyes: Red reflex is present bilaterally. Visual tracking is normal. Pupils are equal, round, and reactive to light. Conjunctivae and lids are normal. Right eye exhibits no discharge. Left eye exhibits no discharge. No scleral icterus.   Neck: Trachea normal and normal range of motion. Neck supple. No tenderness is present.   Cardiovascular: Normal rate and regular rhythm.   Pulmonary/Chest: Effort normal. No nasal flaring or stridor. No respiratory distress. She has no wheezes. She has rhonchi (bilateral throughout). She has no rales. She exhibits no retraction.   Abdominal: Soft. Bowel sounds are normal. She exhibits no distension. There is no tenderness.   Musculoskeletal: Normal range of motion. She exhibits no tenderness or deformity.   Lymphadenopathy:     She has no cervical adenopathy.   Neurological: She is alert. She has normal strength and normal reflexes. Suck normal.   Skin: Skin is warm, dry, not diaphoretic, not pale, no rash and not purpuric. Capillary refill takes less than 2 seconds. Turgor is normal. petechiaecyanosis  Nursing note and vitals reviewed.        Assessment:       1. Abnormal breath sounds    2. Acute bronchiolitis due to unspecified organism    3. Recurrent acute suppurative otitis media of right ear without spontaneous rupture of tympanic membrane        Plan:         1. Abnormal breath sounds  POST TX: completely clear throughout.   - albuterol nebulizer solution 2.5 mg    2. Acute bronchiolitis due to unspecified organism  Advised mom this is very likely post flu, but she is clearing well. Will not test  since we are 6 days in and taking all precautions.   - HME - OTHER  - albuterol (ACCUNEB) 0.63 mg/3 mL Nebu; Take 3 mLs (0.63 mg total) by nebulization every 6 (six) hours as needed. Rescue  Dispense: 30 vial; Refill: 0    3. Recurrent acute suppurative otitis media of right ear without spontaneous rupture of tympanic membrane  Advised will change a/b, seemingly getting worse as she is not sleeping or eating well. Pulling on ear in office.   - amoxicillin-clavulanate (AUGMENTIN) 600-42.9 mg/5 mL SusR; 3.5 ml po bid  Dispense: 75 mL; Refill: 0     Spontaneous

## 2020-02-05 ENCOUNTER — APPOINTMENT (OUTPATIENT)
Dept: OBGYN | Facility: CLINIC | Age: 30
End: 2020-02-05
Payer: MEDICAID

## 2020-02-05 VITALS
WEIGHT: 148 LBS | BODY MASS INDEX: 25.27 KG/M2 | HEART RATE: 62 BPM | HEIGHT: 64 IN | SYSTOLIC BLOOD PRESSURE: 115 MMHG | DIASTOLIC BLOOD PRESSURE: 67 MMHG

## 2020-02-05 PROCEDURE — 0503F POSTPARTUM CARE VISIT: CPT

## 2020-02-24 ENCOUNTER — APPOINTMENT (OUTPATIENT)
Dept: OBGYN | Facility: CLINIC | Age: 30
End: 2020-02-24

## 2020-03-27 ENCOUNTER — APPOINTMENT (OUTPATIENT)
Dept: DERMATOLOGY | Facility: CLINIC | Age: 30
End: 2020-03-27

## 2020-03-29 NOTE — HISTORY OF PRESENT ILLNESS
[Postpartum Follow Up] : postpartum follow up [Delivery Date: ___] : on [unfilled] [Male] : Delivery History: baby boy [Wt. ___] : weighing [unfilled] [Boy] : baby is a boy [Infant's Name ___] : [unfilled] [___ Lbs] : [unfilled] lbs [___ Oz] : [unfilled] oz [Circumcised] : circumcised [Living at Home] : is currently living at home [Bottle Feeding] : bottle feeding [Breastfeeding] : currently nursing [Intended Contraception] : Intended Contraception: [] : delivered by vaginal delivery [Back to Normal] : is back to normal in size [Mild] : mild vaginal bleeding [Normal] : the vagina was normal [Not Done] : Examination of breasts not done [Doing Well] : is doing well [No Sign of Infection] : is showing no signs of infection [None] : None [Excellent Pain Control] : has excellent pain control [Complications:___] : no complications [Rhogam] : Rhogam was not administered [Rubella Vaccine] : Rubella vaccine was not administered [Pertussis Vaccine] : Pertussis vaccine was not administered [BTL] : no tubal ligation [BF with Difficulty] : nursing without difficulty [Resumed Menses] : has not resumed her menses [Resumed [place]] : has not resumed intercourse [S/Sx PP Depression] : no signs/symptoms of postpartum depression [Erythema] : not erythematous [Cervix Sample Taken] : cervical sample not taken for a Pap smear

## 2020-04-30 NOTE — OB PROVIDER H&P - NSGENETICTESTING_OBGYN_ALL_OB
[de-identified] : Mr. Deolng is a 59 y/o male, with a previously diagnosed acute myeloid leukemia. A bone marrow biopsy from 7/3 revealed Acute myeloid leukemia (AML). FISH - report shows a population of aberrant myeloid blasts. He is currently being treated by Dr. Coelho for high risk AML with HIDAC consolidation chemotherapy. He was referred by Dr. Coelho for an initial consultation of a Haplo- transplant. \par \par The patient has a history of multiple blood clots - factor V Leiden runs in the family. He was diagnosed with sleep apnea, but refuses to use CPAP. He is a former smoker, 40 years. He also has a past medical history of PE, HTN, cardiomyopathy. 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 \par Upon admission, a TLC was placed in IR. Mr. Delong received IV fluid hydration, pain management, nutritional support, anxiolysis, antiemetics, and antiviral,  antifungal, antibacterial, GI, PCP and VOD prophylaxis. When his ANC dropped  below 1000, he was started on prophylactic Cefepime. Labs were monitored on a daily basis and he received transfusional support and electrolyte repletion as needed. \par \par While in patient, Mr. 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 \par Upon admission, a TLC was placed in IR. Mr. Delong received IV fluid hydration, pain management, nutritional support, anxiolysis, antiemetics, and antiviral, antifungal, antibacterial, GI, PCP and VOD prophylaxis. When his ANC dropped below 1000, he was started on prophylactic Cefepime. Labs were monitored on a daily basis and he received transfusional support and electrolyte repletion as needed. \par \par While in patient, Mr. 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 \par \par During admission, Mr. Delong had pancytopenia related to the high dose chemotherapy prep regimen. He also experienced neutropenic fevers. When he became febrile, blood and urine cultures were sent and a CXR was completed which were unremarkable. \par \par On 10/9/2019, following premedications, pt received 250 ml of allogeneic, related, haplo, fresh, mobilized HPC apheresis over 30-60 minutes. \par Cell counts as follows: \par \par Total MNCs (x10^8/kg) = 5.17 \par CD 34 cells (x10^6/kg) = 7.34 \par CD 3 Cells (x 10^7/kg) = 19.81 \par Cell viability (%) = 100 \par \par Pt tolerated infusion without any adverse reaction or complications. \par \par Engraftment was noted on 10/28/2019. Daily Zarxio was discontinued, as was Cefepime given negative cultures. Mr. Delong was discharged home to f/u at the [facility].  [de-identified] : On 4/17/20 telephone visit, day +191 of SCT today. Overall continues to feel well. Reports adequate PO intake and hydration. Ongoing SOB on exertion, waxes and wanes. Redness and discharge from eyes has resolved with abx eye gtts. Reports recurrent redness on chest and neck, admits he has not been compliant with steroid cream. Stable BLE edema, L>R which waxes and wanes. Ongoing insomnia despite Xanax 0.5mg qhs. Compliant with post transplant meds and restrictions. Currently taking FK 0.5mg daily and prednisone 10mg daily. Denies fever, chills, headache, dizziness, blurred vision, mucositis/odynophagia, chest pain, cough, nausea/vomiting, diarrhea/constipation, abdominal pain, dysuria, bleeding, pain \par \par Pt verbally consents to tele visit....was just hospitalized for presumed bacterial pna and CHF..covid neg 4 times....feeling better No, other reason

## 2022-01-13 NOTE — OB PROVIDER TRIAGE NOTE - NSPRENATALCARE_OBGYN_ALL_OB
1. Drink fluids  2. Tylenol as needs  3. Follow up in two weeks with Luis pabon bp.  She has lost 50 pounds with dieting and meds and may need adjustment of bp meds.  
Yes

## 2022-10-26 ENCOUNTER — ASOB RESULT (OUTPATIENT)
Age: 32
End: 2022-10-26

## 2022-10-26 ENCOUNTER — APPOINTMENT (OUTPATIENT)
Dept: OBGYN | Facility: CLINIC | Age: 32
End: 2022-10-26

## 2022-10-26 VITALS
DIASTOLIC BLOOD PRESSURE: 78 MMHG | WEIGHT: 143 LBS | SYSTOLIC BLOOD PRESSURE: 120 MMHG | HEART RATE: 96 BPM | HEIGHT: 64 IN | BODY MASS INDEX: 24.41 KG/M2

## 2022-10-26 DIAGNOSIS — O21.0 MILD HYPEREMESIS GRAVIDARUM: ICD-10-CM

## 2022-10-26 DIAGNOSIS — N91.2 AMENORRHEA, UNSPECIFIED: ICD-10-CM

## 2022-10-26 PROCEDURE — 99215 OFFICE O/P EST HI 40 MIN: CPT

## 2022-10-26 PROCEDURE — 76801 OB US < 14 WKS SINGLE FETUS: CPT

## 2022-10-26 RX ORDER — OMEGA-3/DHA/EPA/FISH OIL 1000 MG
1000 CAPSULE ORAL DAILY
Qty: 30 | Refills: 3 | Status: COMPLETED | COMMUNITY
Start: 2019-10-31 | End: 2022-10-26

## 2022-10-26 RX ORDER — DOXYLAMINE SUCCINATE 25 MG
25 TABLET ORAL
Refills: 0 | Status: COMPLETED | COMMUNITY
Start: 2019-09-09 | End: 2022-10-26

## 2022-10-26 RX ORDER — CHROMIUM 200 MCG
TABLET ORAL
Refills: 0 | Status: COMPLETED | COMMUNITY
End: 2022-10-26

## 2022-10-26 RX ORDER — OMEGA-3/DHA/EPA/FISH OIL 1000 MG
1000 CAPSULE ORAL DAILY
Qty: 90 | Refills: 3 | Status: COMPLETED | COMMUNITY
Start: 2019-12-13 | End: 2022-10-26

## 2022-10-26 RX ORDER — DOXYLAMINE SUCCINATE AND PYRIDOXINE HYDROCHLORIDE 20; 20 MG/1; MG/1
20-20 TABLET, EXTENDED RELEASE ORAL
Qty: 60 | Refills: 1 | Status: ACTIVE | COMMUNITY
Start: 2022-10-26 | End: 1900-01-01

## 2022-10-26 RX ORDER — DOXYLAMINE SUCCINATE 25 MG
TABLET ORAL
Refills: 0 | Status: COMPLETED | COMMUNITY
End: 2022-10-26

## 2022-10-27 ENCOUNTER — NON-APPOINTMENT (OUTPATIENT)
Age: 32
End: 2022-10-27

## 2022-10-28 LAB
ABO + RH PNL BLD: NORMAL
ALBUMIN SERPL ELPH-MCNC: 4.5 G/DL
ALP BLD-CCNC: 50 U/L
ALT SERPL-CCNC: 16 U/L
ANION GAP SERPL CALC-SCNC: 10 MMOL/L
AST SERPL-CCNC: 15 U/L
BACTERIA UR CULT: NORMAL
BASOPHILS # BLD AUTO: 0.03 K/UL
BASOPHILS NFR BLD AUTO: 0.2 %
BILIRUB SERPL-MCNC: 0.5 MG/DL
BLD GP AB SCN SERPL QL: NORMAL
BUN SERPL-MCNC: 14 MG/DL
C TRACH RRNA SPEC QL NAA+PROBE: NOT DETECTED
CALCIUM SERPL-MCNC: 9.5 MG/DL
CHLORIDE SERPL-SCNC: 102 MMOL/L
CO2 SERPL-SCNC: 25 MMOL/L
CREAT SERPL-MCNC: 0.53 MG/DL
EGFR: 125 ML/MIN/1.73M2
EOSINOPHIL # BLD AUTO: 0.21 K/UL
EOSINOPHIL NFR BLD AUTO: 1.7 %
ESTIMATED AVERAGE GLUCOSE: 103 MG/DL
GLUCOSE SERPL-MCNC: 91 MG/DL
HBA1C MFR BLD HPLC: 5.2 %
HBV SURFACE AG SER QL: NONREACTIVE
HCT VFR BLD CALC: 39.3 %
HCV AB SER QL: NONREACTIVE
HCV S/CO RATIO: 0.12 S/CO
HGB A MFR BLD: 97.2 %
HGB A2 MFR BLD: 2.8 %
HGB BLD-MCNC: 12.4 G/DL
HGB FRACT BLD-IMP: NORMAL
HIV1+2 AB SPEC QL IA.RAPID: NONREACTIVE
HPV HIGH+LOW RISK DNA PNL CVX: NOT DETECTED
IMM GRANULOCYTES NFR BLD AUTO: 0.4 %
LEAD BLD-MCNC: <1 UG/DL
LYMPHOCYTES # BLD AUTO: 1.78 K/UL
LYMPHOCYTES NFR BLD AUTO: 14.5 %
MAN DIFF?: NORMAL
MCHC RBC-ENTMCNC: 27.4 PG
MCHC RBC-ENTMCNC: 31.6 GM/DL
MCV RBC AUTO: 86.9 FL
MEV IGG FLD QL IA: 37.6 AU/ML
MEV IGG+IGM SER-IMP: POSITIVE
MONOCYTES # BLD AUTO: 0.71 K/UL
MONOCYTES NFR BLD AUTO: 5.8 %
N GONORRHOEA RRNA SPEC QL NAA+PROBE: NOT DETECTED
NEUTROPHILS # BLD AUTO: 9.51 K/UL
NEUTROPHILS NFR BLD AUTO: 77.4 %
PLATELET # BLD AUTO: 267 K/UL
POTASSIUM SERPL-SCNC: 5.1 MMOL/L
PROT SERPL-MCNC: 7.9 G/DL
RBC # BLD: 4.52 M/UL
RBC # FLD: 13.9 %
RUBV IGG FLD-ACNC: 3.9 INDEX
RUBV IGG SER-IMP: POSITIVE
SODIUM SERPL-SCNC: 137 MMOL/L
SOURCE AMPLIFICATION: NORMAL
T GONDII AB SER-IMP: NEGATIVE
T GONDII AB SER-IMP: NEGATIVE
T GONDII IGG SER QL: <3 IU/ML
T GONDII IGM SER QL: <3 AU/ML
T PALLIDUM AB SER QL IA: NEGATIVE
TSH SERPL-ACNC: 1.17 UIU/ML
VZV AB TITR SER: POSITIVE
VZV IGG SER IF-ACNC: 668 INDEX
WBC # FLD AUTO: 12.29 K/UL

## 2022-10-28 NOTE — HISTORY OF PRESENT ILLNESS
[FreeTextEntry1] : Patient presents for confirmation of pregnant.\par LMP end of August\par Patient is not sure about the pregnancy and having hard time with making the decision.\par Patient gets very anuseous and has difficulty with first few months and is worried about that and had  delivery at 34+ weeks last time\par

## 2022-10-31 ENCOUNTER — NON-APPOINTMENT (OUTPATIENT)
Age: 32
End: 2022-10-31

## 2022-11-01 LAB
M TB IFN-G BLD-IMP: NEGATIVE
QUANTIFERON TB PLUS MITOGEN MINUS NIL: 7.61 IU/ML
QUANTIFERON TB PLUS NIL: 0.03 IU/ML
QUANTIFERON TB PLUS TB1 MINUS NIL: 0 IU/ML
QUANTIFERON TB PLUS TB2 MINUS NIL: 0 IU/ML

## 2022-12-06 LAB
B19V IGG SER QL IA: 6.01 INDEX
B19V IGG+IGM SER-IMP: NORMAL
B19V IGG+IGM SER-IMP: POSITIVE
B19V IGM FLD-ACNC: 0.31 INDEX
B19V IGM SER-ACNC: NEGATIVE
CYTOLOGY CVX/VAG DOC THIN PREP: NORMAL

## 2023-01-23 ENCOUNTER — APPOINTMENT (OUTPATIENT)
Dept: MATERNAL FETAL MEDICINE | Facility: CLINIC | Age: 33
End: 2023-01-23
Payer: COMMERCIAL

## 2023-01-23 ENCOUNTER — APPOINTMENT (OUTPATIENT)
Dept: ANTEPARTUM | Facility: CLINIC | Age: 33
End: 2023-01-23
Payer: COMMERCIAL

## 2023-01-23 ENCOUNTER — ASOB RESULT (OUTPATIENT)
Age: 33
End: 2023-01-23

## 2023-01-23 PROCEDURE — 99213 OFFICE O/P EST LOW 20 MIN: CPT | Mod: 25

## 2023-01-23 PROCEDURE — 76811 OB US DETAILED SNGL FETUS: CPT | Mod: 59

## 2023-01-23 PROCEDURE — 99214 OFFICE O/P EST MOD 30 MIN: CPT | Mod: 25

## 2023-01-23 PROCEDURE — 76817 TRANSVAGINAL US OBSTETRIC: CPT

## 2023-01-25 ENCOUNTER — APPOINTMENT (OUTPATIENT)
Dept: ANTEPARTUM | Facility: CLINIC | Age: 33
End: 2023-01-25

## 2023-01-25 ENCOUNTER — APPOINTMENT (OUTPATIENT)
Dept: MATERNAL FETAL MEDICINE | Facility: CLINIC | Age: 33
End: 2023-01-25

## 2023-04-14 ENCOUNTER — ASOB RESULT (OUTPATIENT)
Age: 33
End: 2023-04-14

## 2023-04-14 ENCOUNTER — APPOINTMENT (OUTPATIENT)
Dept: MATERNAL FETAL MEDICINE | Facility: CLINIC | Age: 33
End: 2023-04-14
Payer: COMMERCIAL

## 2023-04-14 DIAGNOSIS — O24.419 GESTATIONAL DIABETES MELLITUS IN PREGNANCY, UNSPECIFIED CONTROL: ICD-10-CM

## 2023-04-14 PROCEDURE — G0109 DIAB MANAGE TRN IND/GROUP: CPT | Mod: 95

## 2023-04-14 RX ORDER — LANCETS 33 GAUGE
EACH MISCELLANEOUS
Qty: 4 | Refills: 4 | Status: ACTIVE | COMMUNITY
Start: 2023-04-14 | End: 1900-01-01

## 2023-04-14 RX ORDER — URINE ACETONE TEST STRIPS
STRIP MISCELLANEOUS
Qty: 1 | Refills: 2 | Status: ACTIVE | COMMUNITY
Start: 2023-04-14 | End: 1900-01-01

## 2023-04-14 RX ORDER — BLOOD-GLUCOSE METER
KIT MISCELLANEOUS
Qty: 2 | Refills: 0 | Status: ACTIVE | COMMUNITY
Start: 2023-04-14 | End: 1900-01-01

## 2023-04-14 RX ORDER — BLOOD-GLUCOSE METER
W/DEVICE KIT MISCELLANEOUS
Qty: 1 | Refills: 0 | Status: ACTIVE | COMMUNITY
Start: 2023-04-14 | End: 1900-01-01

## 2023-04-21 ENCOUNTER — APPOINTMENT (OUTPATIENT)
Dept: MATERNAL FETAL MEDICINE | Facility: CLINIC | Age: 33
End: 2023-04-21
Payer: COMMERCIAL

## 2023-04-21 ENCOUNTER — ASOB RESULT (OUTPATIENT)
Age: 33
End: 2023-04-21

## 2023-04-21 PROCEDURE — G0108 DIAB MANAGE TRN  PER INDIV: CPT | Mod: 95

## 2023-05-01 ENCOUNTER — ASOB RESULT (OUTPATIENT)
Age: 33
End: 2023-05-01

## 2023-05-01 ENCOUNTER — APPOINTMENT (OUTPATIENT)
Dept: MATERNAL FETAL MEDICINE | Facility: CLINIC | Age: 33
End: 2023-05-01
Payer: COMMERCIAL

## 2023-05-01 PROCEDURE — G0108 DIAB MANAGE TRN  PER INDIV: CPT | Mod: 95

## 2023-05-12 ENCOUNTER — APPOINTMENT (OUTPATIENT)
Dept: ANTEPARTUM | Facility: CLINIC | Age: 33
End: 2023-05-12
Payer: COMMERCIAL

## 2023-05-12 ENCOUNTER — INPATIENT (INPATIENT)
Facility: HOSPITAL | Age: 33
LOS: 1 days | Discharge: HOME | End: 2023-05-14
Attending: SPECIALIST | Admitting: SPECIALIST
Payer: COMMERCIAL

## 2023-05-12 VITALS
RESPIRATION RATE: 16 BRPM | DIASTOLIC BLOOD PRESSURE: 75 MMHG | SYSTOLIC BLOOD PRESSURE: 126 MMHG | HEART RATE: 108 BPM | TEMPERATURE: 98 F

## 2023-05-12 DIAGNOSIS — O26.899 OTHER SPECIFIED PREGNANCY RELATED CONDITIONS, UNSPECIFIED TRIMESTER: ICD-10-CM

## 2023-05-12 DIAGNOSIS — Z98.890 OTHER SPECIFIED POSTPROCEDURAL STATES: Chronic | ICD-10-CM

## 2023-05-12 LAB
BASOPHILS # BLD AUTO: 0.04 K/UL — SIGNIFICANT CHANGE UP (ref 0–0.2)
BASOPHILS NFR BLD AUTO: 0.3 % — SIGNIFICANT CHANGE UP (ref 0–2)
BLD GP AB SCN SERPL QL: NEGATIVE — SIGNIFICANT CHANGE UP
COVID-19 SPIKE DOMAIN AB INTERP: POSITIVE
COVID-19 SPIKE DOMAIN ANTIBODY RESULT: >250 U/ML — HIGH
EOSINOPHIL # BLD AUTO: 0.24 K/UL — SIGNIFICANT CHANGE UP (ref 0–0.5)
EOSINOPHIL NFR BLD AUTO: 2 % — SIGNIFICANT CHANGE UP (ref 0–6)
HCT VFR BLD CALC: 37.5 % — SIGNIFICANT CHANGE UP (ref 34.5–45)
HGB BLD-MCNC: 11.8 G/DL — SIGNIFICANT CHANGE UP (ref 11.5–15.5)
IANC: 9.38 K/UL — HIGH (ref 1.8–7.4)
IMM GRANULOCYTES NFR BLD AUTO: 0.8 % — SIGNIFICANT CHANGE UP (ref 0–0.9)
LYMPHOCYTES # BLD AUTO: 1.53 K/UL — SIGNIFICANT CHANGE UP (ref 1–3.3)
LYMPHOCYTES # BLD AUTO: 12.7 % — LOW (ref 13–44)
MCHC RBC-ENTMCNC: 26.9 PG — LOW (ref 27–34)
MCHC RBC-ENTMCNC: 31.5 GM/DL — LOW (ref 32–36)
MCV RBC AUTO: 85.4 FL — SIGNIFICANT CHANGE UP (ref 80–100)
MONOCYTES # BLD AUTO: 0.74 K/UL — SIGNIFICANT CHANGE UP (ref 0–0.9)
MONOCYTES NFR BLD AUTO: 6.2 % — SIGNIFICANT CHANGE UP (ref 2–14)
NEUTROPHILS # BLD AUTO: 9.38 K/UL — HIGH (ref 1.8–7.4)
NEUTROPHILS NFR BLD AUTO: 78 % — HIGH (ref 43–77)
NRBC # BLD: 0 /100 WBCS — SIGNIFICANT CHANGE UP (ref 0–0)
NRBC # FLD: 0 K/UL — SIGNIFICANT CHANGE UP (ref 0–0)
PLATELET # BLD AUTO: 236 K/UL — SIGNIFICANT CHANGE UP (ref 150–400)
RBC # BLD: 4.39 M/UL — SIGNIFICANT CHANGE UP (ref 3.8–5.2)
RBC # FLD: 17.6 % — HIGH (ref 10.3–14.5)
RH IG SCN BLD-IMP: POSITIVE — SIGNIFICANT CHANGE UP
SARS-COV-2 IGG+IGM SERPL QL IA: >250 U/ML — HIGH
SARS-COV-2 IGG+IGM SERPL QL IA: POSITIVE
WBC # BLD: 12.03 K/UL — HIGH (ref 3.8–10.5)
WBC # FLD AUTO: 12.03 K/UL — HIGH (ref 3.8–10.5)

## 2023-05-12 PROCEDURE — 59409 OBSTETRICAL CARE: CPT

## 2023-05-12 PROCEDURE — 76815 OB US LIMITED FETUS(S): CPT | Mod: 26

## 2023-05-12 RX ORDER — ACETAMINOPHEN 500 MG
975 TABLET ORAL
Refills: 0 | Status: DISCONTINUED | OUTPATIENT
Start: 2023-05-12 | End: 2023-05-14

## 2023-05-12 RX ORDER — SIMETHICONE 80 MG/1
80 TABLET, CHEWABLE ORAL EVERY 4 HOURS
Refills: 0 | Status: DISCONTINUED | OUTPATIENT
Start: 2023-05-12 | End: 2023-05-14

## 2023-05-12 RX ORDER — OXYTOCIN 10 UNIT/ML
41.67 VIAL (ML) INJECTION
Qty: 20 | Refills: 0 | Status: DISCONTINUED | OUTPATIENT
Start: 2023-05-12 | End: 2023-05-14

## 2023-05-12 RX ORDER — VANCOMYCIN HCL 1 G
1700 VIAL (EA) INTRAVENOUS EVERY 8 HOURS
Refills: 0 | Status: DISCONTINUED | OUTPATIENT
Start: 2023-05-12 | End: 2023-05-12

## 2023-05-12 RX ORDER — LANOLIN
1 OINTMENT (GRAM) TOPICAL EVERY 6 HOURS
Refills: 0 | Status: DISCONTINUED | OUTPATIENT
Start: 2023-05-12 | End: 2023-05-14

## 2023-05-12 RX ORDER — OXYTOCIN 10 UNIT/ML
2 VIAL (ML) INJECTION
Qty: 30 | Refills: 0 | Status: DISCONTINUED | OUTPATIENT
Start: 2023-05-12 | End: 2023-05-14

## 2023-05-12 RX ORDER — DIPHENHYDRAMINE HCL 50 MG
25 CAPSULE ORAL EVERY 6 HOURS
Refills: 0 | Status: DISCONTINUED | OUTPATIENT
Start: 2023-05-12 | End: 2023-05-14

## 2023-05-12 RX ORDER — SODIUM CHLORIDE 9 MG/ML
1000 INJECTION, SOLUTION INTRAVENOUS
Refills: 0 | Status: DISCONTINUED | OUTPATIENT
Start: 2023-05-12 | End: 2023-05-12

## 2023-05-12 RX ORDER — SODIUM CHLORIDE 9 MG/ML
3 INJECTION INTRAMUSCULAR; INTRAVENOUS; SUBCUTANEOUS EVERY 8 HOURS
Refills: 0 | Status: DISCONTINUED | OUTPATIENT
Start: 2023-05-12 | End: 2023-05-14

## 2023-05-12 RX ORDER — BENZOCAINE 10 %
1 GEL (GRAM) MUCOUS MEMBRANE EVERY 6 HOURS
Refills: 0 | Status: DISCONTINUED | OUTPATIENT
Start: 2023-05-12 | End: 2023-05-14

## 2023-05-12 RX ORDER — PRAMOXINE HYDROCHLORIDE 150 MG/15G
1 AEROSOL, FOAM RECTAL EVERY 4 HOURS
Refills: 0 | Status: DISCONTINUED | OUTPATIENT
Start: 2023-05-12 | End: 2023-05-14

## 2023-05-12 RX ORDER — HYDROCORTISONE 1 %
1 OINTMENT (GRAM) TOPICAL EVERY 6 HOURS
Refills: 0 | Status: DISCONTINUED | OUTPATIENT
Start: 2023-05-12 | End: 2023-05-14

## 2023-05-12 RX ORDER — OXYTOCIN 10 UNIT/ML
333.33 VIAL (ML) INJECTION
Qty: 20 | Refills: 0 | Status: DISCONTINUED | OUTPATIENT
Start: 2023-05-12 | End: 2023-05-12

## 2023-05-12 RX ORDER — TETANUS TOXOID, REDUCED DIPHTHERIA TOXOID AND ACELLULAR PERTUSSIS VACCINE, ADSORBED 5; 2.5; 8; 8; 2.5 [IU]/.5ML; [IU]/.5ML; UG/.5ML; UG/.5ML; UG/.5ML
0.5 SUSPENSION INTRAMUSCULAR ONCE
Refills: 0 | Status: DISCONTINUED | OUTPATIENT
Start: 2023-05-12 | End: 2023-05-14

## 2023-05-12 RX ORDER — DIBUCAINE 1 %
1 OINTMENT (GRAM) RECTAL EVERY 6 HOURS
Refills: 0 | Status: DISCONTINUED | OUTPATIENT
Start: 2023-05-12 | End: 2023-05-14

## 2023-05-12 RX ORDER — AER TRAVELER 0.5 G/1
1 SOLUTION RECTAL; TOPICAL EVERY 4 HOURS
Refills: 0 | Status: DISCONTINUED | OUTPATIENT
Start: 2023-05-12 | End: 2023-05-14

## 2023-05-12 RX ORDER — IBUPROFEN 200 MG
600 TABLET ORAL EVERY 6 HOURS
Refills: 0 | Status: COMPLETED | OUTPATIENT
Start: 2023-05-12 | End: 2024-04-09

## 2023-05-12 RX ORDER — CHLORHEXIDINE GLUCONATE 213 G/1000ML
1 SOLUTION TOPICAL DAILY
Refills: 0 | Status: DISCONTINUED | OUTPATIENT
Start: 2023-05-12 | End: 2023-05-12

## 2023-05-12 RX ORDER — KETOROLAC TROMETHAMINE 30 MG/ML
30 SYRINGE (ML) INJECTION ONCE
Refills: 0 | Status: DISCONTINUED | OUTPATIENT
Start: 2023-05-12 | End: 2023-05-12

## 2023-05-12 RX ORDER — MAGNESIUM HYDROXIDE 400 MG/1
30 TABLET, CHEWABLE ORAL
Refills: 0 | Status: DISCONTINUED | OUTPATIENT
Start: 2023-05-12 | End: 2023-05-14

## 2023-05-12 RX ORDER — OXYCODONE HYDROCHLORIDE 5 MG/1
5 TABLET ORAL ONCE
Refills: 0 | Status: DISCONTINUED | OUTPATIENT
Start: 2023-05-12 | End: 2023-05-14

## 2023-05-12 RX ORDER — OXYCODONE HYDROCHLORIDE 5 MG/1
5 TABLET ORAL
Refills: 0 | Status: DISCONTINUED | OUTPATIENT
Start: 2023-05-12 | End: 2023-05-14

## 2023-05-12 RX ADMIN — Medication 30 MILLIGRAM(S): at 21:40

## 2023-05-12 RX ADMIN — SODIUM CHLORIDE 125 MILLILITER(S): 9 INJECTION, SOLUTION INTRAVENOUS at 15:54

## 2023-05-12 RX ADMIN — Medication 250 MILLIGRAM(S): at 15:51

## 2023-05-12 RX ADMIN — Medication 30 MILLIGRAM(S): at 21:20

## 2023-05-12 RX ADMIN — Medication 12 MILLIGRAM(S): at 15:29

## 2023-05-12 RX ADMIN — Medication 2 MILLIUNIT(S)/MIN: at 18:23

## 2023-05-12 NOTE — OB RN DELIVERY SUMMARY - NS_SEPSISRSKCALC_OBGYN_ALL_OB_FT
EOS calculated successfully. EOS Risk Factor: 0.5/1000 live births ([facility] national incidence); GA=34w6d; Temp=98.6; ROM=9.817; GBS='Unknown'; Antibiotics='Broad spectrum antibiotics 2-3.9 hrs prior to birth'

## 2023-05-12 NOTE — OB PROVIDER TRIAGE NOTE - NS_OBGYNHISTORY_OBGYN_ALL_OB_FT
OB History: : 3/9/2013, , FT, Female 5 lb 5 oz, uncomplicated as per patient  G2: 10/3/2014, , FT, Female, 6 lb 2 oz, uncomplicated as per patient  G3: SAB, D&C   G4: 2017, , FT, Female, 6 lb 14 oz, delivery complicated by "large vaginal tear that caused bleeding" requiring 2 units of PRBCs transfused, denies transfusion reaction, delivered in [place]  G5: 2019, ,  @ 35 weeks, for pre-term labor, Male, 4 lbs, uncomplicated as per patient  G6: Current pregnancy, NIPs low risk       - Elevated GCT, patient unable to perform GTT due to hyperemesis in pregnancy, but taking fingersticks 4 times per day, all wnl followed by diabetic educators who states "all appear wnl"       - Poor OB history, closely followed by MFM       - CF carrier, FOB neg    Prenatal Labs Reviewed: (from [facility]IE labs)  T&S: A+  Rubella: Immune  Hep B: Neg  HIV: Neg  RPR: Neg  GCT: 162  GTT: unable to perform but serial FS wnl  G/C: Neg  GBS: unknown     Ultrasounds:   : Variable presentation, anterior placenta, no previa, BPP ,  g     GYN Hx: Denies fibroids, ovarian cysts, HSV/ STDs, abnormal pap smears

## 2023-05-12 NOTE — OB PROVIDER H&P - HISTORY OF PRESENT ILLNESS
Primary OB: [facility] (but did receive first Prenatal visit with Dr. Bardales)      32 year old  @ 34.6 weeks, JANIA 2023 dated by LMP (9/10/22) consistent with 1st Trimester US, presents to L&D complaining of a gush of clear fluid at 10:30 this morning, that has been continuously leaking. Patient states she only had mild cramping, but just started to feel irregular contractions. Patient admits to normal fetal movement. Denies vaginal bleeding, painful contractions/lower abdominal cramping, fever/chills, shortness of breath,  chest pain, increased swelling, difficulty ambulating, loss of taste/smell, nausea/vomiting/diarrhea, rash, weakness, paresthesia, change in appetite, dizziness, lightheadedness, cough, nasal congestion, runny nose    Antepartum Course:  1.  x 5, one complicated by PPH secondary to "laceration"   2. Elevated GCT, fingersticks wnl       Allergies: PCN - Rash, Shrimp - Rash, NKEA

## 2023-05-12 NOTE — OB PROVIDER H&P - ASSESSMENT
32 year old  @ 34.6 weeks admitted to L&D for PPROM @ 10:30 am, clear, not starting to feel mild contractions, Reactive NST, GBS unknown, cephalic presentation, vital signs stable  - Admit to L&D  - Clear Liquid Diet  - Continuous EFM/toco  - IV access, CBC/T&C/RPR  - Vancomycin for GBS ppx and PCN allergy  - Anesthesia consult   - [facility] Email provided for patient, who states she will send her pre-janae chart for further review  - Administer BMZ as per Dr. Dupont [facility] recommendations   - T&C 2 units PRBCs due to history of PPH   - Re-evaluate SVE in 4 hours or sooner if clinically indicated  - Educated and discussed with patient the assessment and plan, pt verbalized understanding and agreement with assessment and plan, all questions answered  - Discussed with Dr. Moses

## 2023-05-12 NOTE — OB PROVIDER TRIAGE NOTE - HISTORY OF PRESENT ILLNESS
32 year old  @ 34.6 weeks, JANIA 2023 dated by LMP (9/10/22) consistent with 1st Trimester US, presents to L&D complaining of a gush of clear fluid at 10:30 this morning, that has been continuously leaking. Patient states she only had mild cramping, but just started to feel irregular contractions. Patient admits to normal fetal movement. Denies vaginal bleeding, painful contractions/lower abdominal cramping, fever/chills, shortness of breath,  chest pain, increased swelling, difficulty ambulating, loss of taste/smell, nausea/vomiting/diarrhea, rash, weakness, paresthesia, change in appetite, dizziness, lightheadedness, cough, nasal congestion, runny nose    Antepartum Course:  1.  x 5, one complicated by PPH secondary to "laceration"   2. Elevated GCT, fingersticks wnl       Allergies: PCN - Rash, SHrimp - Rash, NKEA      Psych: Denies anxiety, depression, or other mental health disease    Social: Denies cigarette/tobacco/alcohol/illicit drug use    Primary OB: [facility]     32 year old  @ 34.6 weeks, JANIA 2023 dated by LMP (9/10/22) consistent with 1st Trimester US, presents to L&D complaining of a gush of clear fluid at 10:30 this morning, that has been continuously leaking. Patient states she only had mild cramping, but just started to feel irregular contractions. Patient admits to normal fetal movement. Denies vaginal bleeding, painful contractions/lower abdominal cramping, fever/chills, shortness of breath,  chest pain, increased swelling, difficulty ambulating, loss of taste/smell, nausea/vomiting/diarrhea, rash, weakness, paresthesia, change in appetite, dizziness, lightheadedness, cough, nasal congestion, runny nose    Antepartum Course:  1.  x 5, one complicated by PPH secondary to "laceration"   2. Elevated GCT, fingersticks wnl       Allergies: PCN - Rash, Shrimp - Rash, NKEA      Psych: Denies anxiety, depression, or other mental health disease    Social: Denies cigarette/tobacco/alcohol/illicit drug use

## 2023-05-12 NOTE — OB PROVIDER LABOR PROGRESS NOTE - ASSESSMENT
- pt comfortable with epidural   - to start pitocin   - category I tracing     Yazmin Elizabeth, PGY2  d/w Dr. Corona

## 2023-05-12 NOTE — OB PROVIDER DELIVERY SUMMARY - NSPROVIDERDELIVERYNOTE_OBGYN_ALL_OB_FT
Patient was found to be fully dilated and directed to push with contractions. Spontaneous vaginal delivery of liveborn male. Head, shoulders and body delivered easily. Cord was delayed 1 minute. Cord was cut.  Infant was passed to mother. Placenta delivered spontaneously intact. Uterine massage was performed and pitocin was given. Intermittent uterine atony noted. Bimanual exam revealed minimal clots. Rectal cytotec administered w/ resolution of atony. Vaginal walls, sulci, and cervix examined and noted to be intact.  Fundus was firm.   Good hemostasis was noted.  Count correct x2.

## 2023-05-12 NOTE — OB RN PATIENT PROFILE - NS_ADMITDT_OBGYN_ALL_OB_DT
[de-identified] : I discussed the underlying pathophysiology of the patient's condition in great detail with the patient. I went over the patient's x-rays with them in great detail. I am recommending the patient continues going to physical therapy for his neck. A prescription was provided. A referral to Dr. Owens was provided at the patient's request. All of his questions were answered. He understands and consents to the plan.\par \par FU in 6 weeks.\par after undergoing PT for his neck.
12-May-2023 15:00

## 2023-05-12 NOTE — OB RN TRIAGE NOTE - INTERNATIONAL TRAVEL
Group Topic: BH Recovery Preparedness    Date: 1/5/2020  Start Time: 10:15 AM  End Time: 11:00 AM  Facilitators: PANFILO Coyne; Viki France LCSW    Focus: Recovery Preparedness / An Honest Talk  Number in attendance: 16 (5 RTC & 11 PHP)    Group watched a video of people in recovery having an honest discussion about their experiences, their emotions about recovery, focusing on guilt and how they have persevered and re-built themselves. Patient then joined group discussion on their response to the video, their own emotions, fears, and struggles with preparing for recovery.     Method: Group  Attendance: Present  Participation: Active  Patient Response: Attentive  Mood: Normal  Affect: Calm  Behavior/Socialization: Appropriate to group  Thought Process: Focused  Task Performance: Follows directions     PANFILO Coyne    No

## 2023-05-12 NOTE — OB PROVIDER H&P - NSHPPHYSICALEXAM_GEN_ALL_CORE
Vitals: ICU Vital Signs Last 24 Hrs  T(C): 36.6 (12 May 2023 13:11), Max: 36.6 (12 May 2023 12:14)  T(F): 97.88 (12 May 2023 13:11), Max: 97.9 (12 May 2023 12:14)  HR: 82 (12 May 2023 14:15) (82 - 114)  BP: 131/77 (12 May 2023 14:15) (121/75 - 131/77)  BP(mean): --  ABP: --  ABP(mean): --  RR: 16 (12 May 2023 13:11) (16 - 16)  SpO2: --      Gen: NAD, A+O x 3, resting comfortably  Resp: CTAB  Cardio: RRR  Abd: Gravid, soft, non-distended, non-tender to superficial and deep palpation in all 4 quadrants, no rebound/guarding  Ext: Warm, well perfused, 1+ nonpitting edema bilaterally    EFM: 135 bpm, moderate variability with spontaneous accelerations, no decelerations, Reactive NST  [place]:  Readjust toco    SSE: Spec: External genitalia without lesions/lacerations, mucosa pink without lesions, cervix visualized appears slightly dilated, no active bleeding noted even with valsalva maneuver, no blood clots, Clear fluid noted, normal physiologic discharge present   Positive Pooling/Positive Nitrazine/Positive Ferning    SVE: 1-2/50/-3, no membranes palpate

## 2023-05-12 NOTE — OB RN DELIVERY SUMMARY - NSSELHIDDEN_OBGYN_ALL_OB_FT
[NS_DeliveryAttending1_OBGYN_ALL_OB_FT:[id number]],[NS_DeliveryAssist1_OBGYN_ALL_OB_FT:[id number]],[NS_DeliveryRN_OBGYN_ALL_OB_FT:[id number]]

## 2023-05-12 NOTE — OB RN TRIAGE NOTE - NSICDXPASTMEDICALHX_GEN_ALL_CORE_FT
PAST MEDICAL HISTORY:  Anemia, unspecified type was On iron pill    History of blood transfusion S/P PPH 2018, 2 units PRBC as per the  pateint from the torn artery     (normal spontaneous vaginal delivery) 3/9/2013 FT F 5#9     (normal spontaneous vaginal delivery) 10/3/2014 FT F 6#14     (normal spontaneous vaginal delivery) 2018 FT F 7lbs, PPH and S/P 2 units PRBC

## 2023-05-12 NOTE — OB RN DELIVERY SUMMARY - BABY A: APGAR 5 MIN COLOR, DELIVERY
[facility]   [address] [place] [address]  [phone]  01/29/21    Tamica Reynolds  [address] [id number]      Dear Tamica    Your test results from your last visit have returned.  Your lab work was all normal. If you have any further questions, please feel free to call.   Lab Services on 01/28/2021   Component Date Value Ref Range Status   • HCG, Quantitative 01/28/2021 71* <=4 mUnits/mL Final       Dr. Willson recommends repeating this level in 48 hours.  I will place the future order and you can call for the lab appointment at the time/location you would like to obtain the future lab.     Sincerely,      Lian Willson MD    (0) blue, pale

## 2023-05-12 NOTE — OB NEONATOLOGY/PEDIATRICIAN DELIVERY SUMMARY - NSPEDSNEONOTESA_OBGYN_ALL_OB_FT
Called by OBGYN to attend [facility] delivery due to prematurity. Baby is product of a 34.6 week gestation born to a  32 y.o. F. Maternal labs include Blood Type A+, HIV-, RPR NR, Hep B NR, Rubella I, GBS unknown, given Vancomycin at 15:51. Maternal history is uncomplicated. Pregnancy was complicated by failed GCT but normal FS. SROM on  at 10:45 with clear fluid, at approximately 10 hours. Delivery complicated by . Baby emerged crying and vigorous. Infant was brought to [place] warmer and warmed, dried, stimulated and suctioned. HR>100, normal respiratory effort with APGARS of 8/8. Delayed cord clamping x30s performed. Resuscitation included deep suctioning and CPAP initiated at 10min of life for grunting, max settings 5/20 and FiO2 30%. Highest maternal temperature 37 C. EOS score: 0.95. Admit to NICU. Mom plans to initiate breastfeeding, declines Hep B vaccine and consents circ.    Physical Exam:  Gen: NAD, +grimace  HEENT: anterior fontanel open soft and flat, no cleft lip/palate, ears normal set, no ear pits or tags. no lesions in mouth/throat, nares clinically patent  Resp: no increased work of breathing, good air entry b/l, clear to auscultation bilaterally  Cardio: normal S1/S2, regular rate and rhythm, no murmur appreciated  Abd: soft, non tender, non distended, + bowel sounds, umbilical cord with 3 vessels  Back: spine midline, no sacral dimple or tuft of hair  Neuro: +grasp/suck/ramy/palmar/plantar, normal tone  Extremities: negative vera and ortolani, moving all extremities, full range of motion x 4, no crepitus  Skin: pink, warm  Genitals: Normal male anatomy, testicles palpable in scrotum b/l, Fernando 1, anus patent

## 2023-05-12 NOTE — OB RN TRIAGE NOTE - FALL HARM RISK - UNIVERSAL INTERVENTIONS
Bed in lowest position, wheels locked, appropriate side rails in place/Call bell, personal items and telephone in reach/Instruct patient to call for assistance before getting out of bed or chair/Non-slip footwear when patient is out of bed/[place] to call system/Physically safe environment - no spills, clutter or unnecessary equipment/Purposeful Proactive Rounding/Room/bathroom lighting operational, light cord in reach

## 2023-05-12 NOTE — OB PROVIDER TRIAGE NOTE - NSHPPHYSICALEXAM_GEN_ALL_CORE
Vitals: ICU Vital Signs Last 24 Hrs  T(C): 36.6 (12 May 2023 13:11), Max: 36.6 (12 May 2023 12:14)  T(F): 97.88 (12 May 2023 13:11), Max: 97.9 (12 May 2023 12:14)  HR: 82 (12 May 2023 14:15) (82 - 114)  BP: 131/77 (12 May 2023 14:15) (121/75 - 131/77)  BP(mean): --  ABP: --  ABP(mean): --  RR: 16 (12 May 2023 13:11) (16 - 16)  SpO2: --      Gen: NAD, A+O x 3, resting comfortably  Resp: CTAB  Cardio: RRR  Abd: Gravid, soft, non-distended, non-tender to superficial and deep palpation in all 4 quadrants, no rebound/guarding  Ext: Warm, well perfused, 1+ nonpitting edema bilaterally    EFM: 135 bpm, moderate variability with spontaneous accelerations, no decelerations, Reactive NST  [place]:  Readjust toco    SSE: Spec: External genitalia without lesions/lacerations, mucosa pink without lesions, cervix visualized appears slightly dilated, no active bleeding noted even with valsalva maneuver, no blood clots, Clear fluid noted, normal physiologic discharge present   Positive Pooling/Positive Nitrazine/Positive Ferning    SVE: 1-2/50/-3, no membranes palpate

## 2023-05-12 NOTE — OB RN TRIAGE NOTE - NS_OBGYNHISTORY_OBGYN_ALL_OB_FT
sab x 1 d&c        2017  PPH 2 units   35week labor sab x 1 d&c  3/9/2013  F 5#  10/3/2014  F ^3  2017  F 5# PPH 2 units  2019 35week labor M 4#9 sab x 1 d&c  3/9/2013  F 5#  10/3/2014  F 6#  2017  F 5# PPH 2 units  2019 35week labor M 4#9

## 2023-05-12 NOTE — OB RN PATIENT PROFILE - NSTRANFUSIONOBJECTION_GEN_ALL_CORE_SIUH
Patient has no objection to blood transfusions. Complex Repair And Double Advancement Flap Text: The defect edges were debeveled with a #15 scalpel blade.  The primary defect was closed partially with a complex linear closure.  Given the location of the remaining defect, shape of the defect and the proximity to free margins a double advancement flap was deemed most appropriate for complete closure of the defect.  Using a sterile surgical marker, an appropriate advancement flap was drawn incorporating the defect and placing the expected incisions within the relaxed skin tension lines where possible.    The area thus outlined was incised deep to adipose tissue with a #15 scalpel blade.  The skin margins were undermined to an appropriate distance in all directions utilizing iris scissors.

## 2023-05-12 NOTE — OB PROVIDER TRIAGE NOTE - NSOBPROVIDERNOTE_OBGYN_ALL_OB_FT
32 year old  @ 34.6 weeks admitted to L&D for PPROM @ 10:30 am, clear, not starting to feel mild contractions, Reactive NST, GBS unknown, cephalic presentation, vital signs stable  - Admit to L&D  - Clear Liquid Diet  - Continuous EFM/toco  - IV access, CBC/T&C/RPR  - Vancomycin for GBS ppx and PCN allergy  - Anesthesia consult   - [facility] Email provided for patient, who states she will send her pre-janae chart for further review  - Administer BMZ as per Dr. Dupont [facility] recommendations   - T&C 2 units PRBCs due to history of PPH   - Re-evaluate SVE in 4 hours or sooner if clinically indicated  - Educated and discussed with patient the assessment and plan, pt verbalized understanding and agreement with assessment and plan, all questions answered  - Discussed with Dr. Moses

## 2023-05-12 NOTE — OB RN PATIENT PROFILE - FALL HARM RISK - UNIVERSAL INTERVENTIONS
Bed in lowest position, wheels locked, appropriate side rails in place/Call bell, personal items and telephone in reach/Instruct patient to call for assistance before getting out of bed or chair/Non-slip footwear when patient is out of bed/[place] to call system/Physically safe environment - no spills, clutter or unnecessary equipment/Purposeful Proactive Rounding/Room/bathroom lighting operational, light cord in reach

## 2023-05-12 NOTE — OB PROVIDER H&P - NSHPSOCIALHISTORY_GEN_ALL_CORE
Psych: Denies anxiety, depression, or other mental health disease    Social: Denies cigarette/tobacco/alcohol/illicit drug use

## 2023-05-12 NOTE — OB PROVIDER DELIVERY SUMMARY - NSSELHIDDEN_OBGYN_ALL_OB_FT
[NS_DeliveryAttending1_OBGYN_ALL_OB_FT:[id number]],[NS_DeliveryAssist1_OBGYN_ALL_OB_FT:[id number]]

## 2023-05-12 NOTE — OB PROVIDER H&P - ALERT: PERTINENT HISTORY
1st Trimester Sonogram/BioPhysical Profile(s)/Follow up Sonogram for Growth/Non Invasive Prenatal Screen (NIPS)/Fetal Non-Stress Test (NST)

## 2023-05-13 LAB
ALBUMIN SERPL ELPH-MCNC: 3.1 G/DL — LOW (ref 3.3–5)
ALP SERPL-CCNC: 89 U/L — SIGNIFICANT CHANGE UP (ref 40–120)
ALT FLD-CCNC: 14 U/L — SIGNIFICANT CHANGE UP (ref 4–33)
ANION GAP SERPL CALC-SCNC: 13 MMOL/L — SIGNIFICANT CHANGE UP (ref 7–14)
APTT BLD: 27.9 SEC — SIGNIFICANT CHANGE UP (ref 27–36.3)
AST SERPL-CCNC: 22 U/L — SIGNIFICANT CHANGE UP (ref 4–32)
BASOPHILS # BLD AUTO: 0.04 K/UL — SIGNIFICANT CHANGE UP (ref 0–0.2)
BASOPHILS NFR BLD AUTO: 0.2 % — SIGNIFICANT CHANGE UP (ref 0–2)
BILIRUB SERPL-MCNC: 0.4 MG/DL — SIGNIFICANT CHANGE UP (ref 0.2–1.2)
BUN SERPL-MCNC: 14 MG/DL — SIGNIFICANT CHANGE UP (ref 7–23)
CALCIUM SERPL-MCNC: 9.5 MG/DL — SIGNIFICANT CHANGE UP (ref 8.4–10.5)
CHLORIDE SERPL-SCNC: 101 MMOL/L — SIGNIFICANT CHANGE UP (ref 98–107)
CO2 SERPL-SCNC: 18 MMOL/L — LOW (ref 22–31)
CREAT SERPL-MCNC: 0.44 MG/DL — LOW (ref 0.5–1.3)
EGFR: 131 ML/MIN/1.73M2 — SIGNIFICANT CHANGE UP
EOSINOPHIL # BLD AUTO: 0.01 K/UL — SIGNIFICANT CHANGE UP (ref 0–0.5)
EOSINOPHIL NFR BLD AUTO: 0 % — SIGNIFICANT CHANGE UP (ref 0–6)
FIBRINOGEN PPP-MCNC: 488 MG/DL — HIGH (ref 200–465)
GLUCOSE SERPL-MCNC: 122 MG/DL — HIGH (ref 70–99)
HCT VFR BLD CALC: 33.5 % — LOW (ref 34.5–45)
HCT VFR BLD CALC: 34 % — LOW (ref 34.5–45)
HGB BLD-MCNC: 10.7 G/DL — LOW (ref 11.5–15.5)
HGB BLD-MCNC: 11 G/DL — LOW (ref 11.5–15.5)
IANC: 19.38 K/UL — HIGH (ref 1.8–7.4)
IMM GRANULOCYTES NFR BLD AUTO: 0.7 % — SIGNIFICANT CHANGE UP (ref 0–0.9)
INR BLD: 0.96 RATIO — SIGNIFICANT CHANGE UP (ref 0.88–1.16)
LDH SERPL L TO P-CCNC: 186 U/L — SIGNIFICANT CHANGE UP (ref 135–225)
LYMPHOCYTES # BLD AUTO: 1.61 K/UL — SIGNIFICANT CHANGE UP (ref 1–3.3)
LYMPHOCYTES # BLD AUTO: 7.3 % — LOW (ref 13–44)
MCHC RBC-ENTMCNC: 26.8 PG — LOW (ref 27–34)
MCHC RBC-ENTMCNC: 32.4 GM/DL — SIGNIFICANT CHANGE UP (ref 32–36)
MCV RBC AUTO: 82.9 FL — SIGNIFICANT CHANGE UP (ref 80–100)
MONOCYTES # BLD AUTO: 0.93 K/UL — HIGH (ref 0–0.9)
MONOCYTES NFR BLD AUTO: 4.2 % — SIGNIFICANT CHANGE UP (ref 2–14)
NEUTROPHILS # BLD AUTO: 19.38 K/UL — HIGH (ref 1.8–7.4)
NEUTROPHILS NFR BLD AUTO: 87.6 % — HIGH (ref 43–77)
NRBC # BLD: 0 /100 WBCS — SIGNIFICANT CHANGE UP (ref 0–0)
NRBC # FLD: 0 K/UL — SIGNIFICANT CHANGE UP (ref 0–0)
PLATELET # BLD AUTO: 240 K/UL — SIGNIFICANT CHANGE UP (ref 150–400)
POTASSIUM SERPL-MCNC: 4.6 MMOL/L — SIGNIFICANT CHANGE UP (ref 3.5–5.3)
POTASSIUM SERPL-SCNC: 4.6 MMOL/L — SIGNIFICANT CHANGE UP (ref 3.5–5.3)
PROT SERPL-MCNC: 6.3 G/DL — SIGNIFICANT CHANGE UP (ref 6–8.3)
PROTHROM AB SERPL-ACNC: 11.1 SEC — SIGNIFICANT CHANGE UP (ref 10.5–13.4)
RBC # BLD: 4.1 M/UL — SIGNIFICANT CHANGE UP (ref 3.8–5.2)
RBC # FLD: 17.4 % — HIGH (ref 10.3–14.5)
SODIUM SERPL-SCNC: 132 MMOL/L — LOW (ref 135–145)
T PALLIDUM AB TITR SER: NEGATIVE — SIGNIFICANT CHANGE UP
URATE SERPL-MCNC: 5.1 MG/DL — SIGNIFICANT CHANGE UP (ref 2.5–7)
WBC # BLD: 22.13 K/UL — HIGH (ref 3.8–10.5)
WBC # FLD AUTO: 22.13 K/UL — HIGH (ref 3.8–10.5)

## 2023-05-13 RX ORDER — IBUPROFEN 200 MG
600 TABLET ORAL EVERY 6 HOURS
Refills: 0 | Status: DISCONTINUED | OUTPATIENT
Start: 2023-05-13 | End: 2023-05-13

## 2023-05-13 RX ORDER — IBUPROFEN 200 MG
600 TABLET ORAL EVERY 6 HOURS
Refills: 0 | Status: DISCONTINUED | OUTPATIENT
Start: 2023-05-13 | End: 2023-05-14

## 2023-05-13 RX ADMIN — Medication 975 MILLIGRAM(S): at 01:30

## 2023-05-13 RX ADMIN — Medication 975 MILLIGRAM(S): at 00:56

## 2023-05-13 RX ADMIN — Medication 600 MILLIGRAM(S): at 14:30

## 2023-05-13 RX ADMIN — Medication 600 MILLIGRAM(S): at 03:32

## 2023-05-13 RX ADMIN — Medication 975 MILLIGRAM(S): at 17:15

## 2023-05-13 RX ADMIN — SODIUM CHLORIDE 3 MILLILITER(S): 9 INJECTION INTRAMUSCULAR; INTRAVENOUS; SUBCUTANEOUS at 23:51

## 2023-05-13 RX ADMIN — Medication 600 MILLIGRAM(S): at 23:51

## 2023-05-13 RX ADMIN — Medication 975 MILLIGRAM(S): at 13:00

## 2023-05-13 RX ADMIN — Medication 975 MILLIGRAM(S): at 06:28

## 2023-05-13 RX ADMIN — Medication 1 TABLET(S): at 12:41

## 2023-05-13 RX ADMIN — Medication 600 MILLIGRAM(S): at 13:38

## 2023-05-13 RX ADMIN — Medication 975 MILLIGRAM(S): at 07:07

## 2023-05-13 RX ADMIN — SODIUM CHLORIDE 3 MILLILITER(S): 9 INJECTION INTRAMUSCULAR; INTRAVENOUS; SUBCUTANEOUS at 14:44

## 2023-05-13 RX ADMIN — SODIUM CHLORIDE 3 MILLILITER(S): 9 INJECTION INTRAMUSCULAR; INTRAVENOUS; SUBCUTANEOUS at 05:32

## 2023-05-13 RX ADMIN — Medication 975 MILLIGRAM(S): at 18:15

## 2023-05-13 RX ADMIN — Medication 975 MILLIGRAM(S): at 12:20

## 2023-05-13 RX ADMIN — Medication 600 MILLIGRAM(S): at 04:10

## 2023-05-13 NOTE — PROGRESS NOTE ADULT - ASSESSMENT
33y/o  PPD#1 from  / PPROM@34w6d in stable condition. PMH significant for Crohn's disease. Overall pt recovering well post-delivery.    #Post-delivery state  - Holding NSAIDs for h/o Crohn's  - Continue with po analgesia  - Increase ambulation  - Continue regular diet  - IV lock  - Labile BPs noted however pt not meeting criteria for hypertensive disorder at this time, f/u AM HELLP labs  - Pt counseled on pp birth control options, still deciding    Sandy Carlin PGY1 33y/o  PPD#1 from  2/ PPROM@34w6d in stable condition. Overall pt recovering well post-delivery.    #Post-delivery state  - Continue with po analgesia  - Increase ambulation  - Continue regular diet  - IV lock  - Labile BPs noted however pt not meeting criteria for hypertensive disorder at this time, f/u AM HELLP labs  - Pt counseled on pp birth control options, still deciding    Sandy Carlin PGY1

## 2023-05-14 ENCOUNTER — TRANSCRIPTION ENCOUNTER (OUTPATIENT)
Age: 33
End: 2023-05-14

## 2023-05-14 VITALS
OXYGEN SATURATION: 99 % | HEART RATE: 77 BPM | DIASTOLIC BLOOD PRESSURE: 68 MMHG | RESPIRATION RATE: 18 BRPM | SYSTOLIC BLOOD PRESSURE: 127 MMHG | TEMPERATURE: 98 F

## 2023-05-14 RX ORDER — ACETAMINOPHEN 500 MG
3 TABLET ORAL
Qty: 0 | Refills: 0 | DISCHARGE
Start: 2023-05-14

## 2023-05-14 RX ORDER — IBUPROFEN 200 MG
1 TABLET ORAL
Qty: 0 | Refills: 0 | DISCHARGE
Start: 2023-05-14

## 2023-05-14 RX ADMIN — SODIUM CHLORIDE 3 MILLILITER(S): 9 INJECTION INTRAMUSCULAR; INTRAVENOUS; SUBCUTANEOUS at 06:29

## 2023-05-14 RX ADMIN — Medication 975 MILLIGRAM(S): at 04:25

## 2023-05-14 RX ADMIN — Medication 975 MILLIGRAM(S): at 03:50

## 2023-05-14 RX ADMIN — Medication 975 MILLIGRAM(S): at 09:29

## 2023-05-14 RX ADMIN — Medication 600 MILLIGRAM(S): at 00:30

## 2023-05-14 RX ADMIN — Medication 975 MILLIGRAM(S): at 10:15

## 2023-05-14 NOTE — DISCHARGE NOTE OB - CARE PROVIDER_API CALL
Odette Hooper)  Obstetrics and Gynecology  [address], [address]  [place] [id number]  Phone: [phone]  Fax: [phone]  Follow Up Time: 1 month

## 2023-05-14 NOTE — PROGRESS NOTE ADULT - ASSESSMENT
33y/o  PPD#2 from  2/2 PPROM@34w6d in stable condition. Overall pt recovering well post-delivery.    #Post-delivery state  - Continue with po analgesia  - Increase ambulation  - Continue regular diet  - IV lock  - Labile BPs noted however pt not meeting criteria for hypertensive disorder at this time, HELLP labs wnl  - Pt counseled on pp birth control options, declining at this time  - Discharge planning    Sandy Carlin PGY1

## 2023-05-14 NOTE — DISCHARGE NOTE OB - CARE PLAN
Principal Discharge DX:	Normal spontaneous vaginal delivery  Assessment and plan of treatment:	Make your follow-up appointment with your doctor as ordered.   No heavy lifting, driving, or strenuous activity for 6 weeks.  Nothing per vagina such as tampons, intercourse, douches or tub baths for 6 weeks or until you see your doctor.  Call your doctor if you're unable to tolerate food, increase in vaginal bleeding, or have difficulty urinating. Call your doctor if you have pain that is not relieved by your perscribed medications. Notify your doctor with any other concerns.   1

## 2023-05-14 NOTE — PROGRESS NOTE ADULT - SUBJECTIVE AND OBJECTIVE BOX
INTERVAL HPI/OVERNIGHT EVENTS:  32y Female s/p labor epidural     Vital Signs Last 24 Hrs  T(C): 36.6 (13 May 2023 06:13), Max: 37.0 (12 May 2023 19:00)  T(F): 97.9 (13 May 2023 06:13), Max: 98.6 (12 May 2023 19:00)  HR: 89 (13 May 2023 06:13) (82 - 127)  BP: 121/70 (13 May 2023 06:13) (115/56 - 156/90)  BP(mean): --  RR: 18 (13 May 2023 06:13) (16 - 18)  SpO2: 99% (13 May 2023 06:13) (98% - 100%)    Parameters below as of 12 May 2023 23:59  Patient On (Oxygen Delivery Method): room air        Patient seen, doing well, no anesthetic complications or complaints noted or reported.            
R1 Progress Note    Patient seen and examined at bedside, no acute overnight events. No acute complaints, pain well controlled. Patient is ambulating, voiding spontaneously, passing gas, and tolerating regular diet. Denies CP, SOB, N/V, HA, blurred vision, epigastric pain.    Vital Signs Last 24 Hours  T(C): 36.6 (05-13-23 @ 06:13), Max: 37.0 (05-12-23 @ 19:00)  HR: 89 (05-13-23 @ 06:13) (82 - 127)  BP: 121/70 (05-13-23 @ 06:13) (115/56 - 156/90)  RR: 18 (05-13-23 @ 06:13) (16 - 18)  SpO2: 99% (05-13-23 @ 06:13) (98% - 100%)    Physical Exam:  General: NAD  Abdomen: Soft, non-tender, non-distended, fundus firm  Pelvic: Lochia wnl    Labs:    Blood Type: A Positive  Antibody Screen: Negative  RPR: Negative               11.0   x     )-----------( 240      ( 05-13 @ 06:15 )             34.0                10.7   x     )-----------( x        ( 05-13 @ 05:40 )             33.5                11.8   12.03 )-----------( 236      ( 05-12 @ 14:45 )             37.5         MEDICATIONS  (STANDING):  acetaminophen     Tablet .. 975 milliGRAM(s) Oral <User Schedule>  betamethasone Injectable 12 milliGRAM(s) IntraMuscular every 24 hours  diphtheria/tetanus/pertussis (acellular) Vaccine (Adacel) 0.5 milliLiter(s) IntraMuscular once  ibuprofen  Tablet. 600 milliGRAM(s) Oral every 6 hours  oxytocin Infusion 41.667 milliUNIT(s)/Min (125 mL/Hr) IV Continuous <Continuous>  oxytocin Infusion. 2 milliUNIT(s)/Min (2 mL/Hr) IV Continuous <Continuous>  prenatal multivitamin 1 Tablet(s) Oral daily  sodium chloride 0.9% lock flush 3 milliLiter(s) IV Push every 8 hours    MEDICATIONS  (PRN):  benzocaine 20%/menthol 0.5% Spray 1 Spray(s) Topical every 6 hours PRN for Perineal discomfort  dibucaine 1% Ointment 1 Application(s) Topical every 6 hours PRN Perineal discomfort  diphenhydrAMINE 25 milliGRAM(s) Oral every 6 hours PRN Pruritus  hydrocortisone 1% Cream 1 Application(s) Topical every 6 hours PRN Moderate Pain (4-6)  lanolin Ointment 1 Application(s) Topical every 6 hours PRN nipple soreness  magnesium hydroxide Suspension 30 milliLiter(s) Oral two times a day PRN Constipation  oxyCODONE    IR 5 milliGRAM(s) Oral every 3 hours PRN Moderate to Severe Pain (4-10)  oxyCODONE    IR 5 milliGRAM(s) Oral once PRN Moderate to Severe Pain (4-10)  pramoxine 1%/zinc 5% Cream 1 Application(s) Topical every 4 hours PRN Moderate Pain (4-6)  simethicone 80 milliGRAM(s) Chew every 4 hours PRN Gas  witch hazel Pads 1 Application(s) Topical every 4 hours PRN Perineal discomfort  
R1 Progress Note    Patient seen and examined at bedside, no acute overnight events. No acute complaints, pain well controlled. Patient is ambulating, voiding spontaneously, passing gas, and tolerating regular diet. Denies CP, SOB, N/V, HA, blurred vision, epigastric pain.    Vital Signs Last 24 Hours  T(C): 36.7 (05-14-23 @ 06:22), Max: 36.7 (05-13-23 @ 17:56)  HR: 72 (05-14-23 @ 06:22) (69 - 86)  BP: 109/66 (05-14-23 @ 06:22) (109/66 - 133/75)  RR: 18 (05-14-23 @ 06:22) (18 - 18)  SpO2: 99% (05-14-23 @ 06:22) (99% - 100%)    Physical Exam:  General: NAD  Abdomen: Soft, non-tender, non-distended, fundus firm  Pelvic: Lochia wnl    Labs:    Blood Type: A Positive  Antibody Screen: Negative  RPR: Negative               11.0   22.13 )-----------( 240      ( 05-13 @ 06:15 )             34.0                10.7   x     )-----------( x        ( 05-13 @ 05:40 )             33.5                11.8   12.03 )-----------( 236      ( 05-12 @ 14:45 )             37.5         MEDICATIONS  (STANDING):  acetaminophen     Tablet .. 975 milliGRAM(s) Oral <User Schedule>  diphtheria/tetanus/pertussis (acellular) Vaccine (Adacel) 0.5 milliLiter(s) IntraMuscular once  ibuprofen  Tablet. 600 milliGRAM(s) Oral every 6 hours  oxytocin Infusion 41.667 milliUNIT(s)/Min (125 mL/Hr) IV Continuous <Continuous>  oxytocin Infusion. 2 milliUNIT(s)/Min (2 mL/Hr) IV Continuous <Continuous>  prenatal multivitamin 1 Tablet(s) Oral daily  sodium chloride 0.9% lock flush 3 milliLiter(s) IV Push every 8 hours    MEDICATIONS  (PRN):  benzocaine 20%/menthol 0.5% Spray 1 Spray(s) Topical every 6 hours PRN for Perineal discomfort  dibucaine 1% Ointment 1 Application(s) Topical every 6 hours PRN Perineal discomfort  diphenhydrAMINE 25 milliGRAM(s) Oral every 6 hours PRN Pruritus  hydrocortisone 1% Cream 1 Application(s) Topical every 6 hours PRN Moderate Pain (4-6)  lanolin Ointment 1 Application(s) Topical every 6 hours PRN nipple soreness  magnesium hydroxide Suspension 30 milliLiter(s) Oral two times a day PRN Constipation  oxyCODONE    IR 5 milliGRAM(s) Oral every 3 hours PRN Moderate to Severe Pain (4-10)  oxyCODONE    IR 5 milliGRAM(s) Oral once PRN Moderate to Severe Pain (4-10)  pramoxine 1%/zinc 5% Cream 1 Application(s) Topical every 4 hours PRN Moderate Pain (4-6)  simethicone 80 milliGRAM(s) Chew every 4 hours PRN Gas  witch hazel Pads 1 Application(s) Topical every 4 hours PRN Perineal discomfort

## 2023-05-14 NOTE — DISCHARGE NOTE OB - PATIENT PORTAL LINK FT
You can access the FollowMyHealth Patient Portal offered by [facility] by registering at the following website: [email or web address]. By joining [facility]’s FollowMyHealth portal, you will also be able to view your health information using other applications (apps) compatible with our system.

## 2023-05-14 NOTE — PROGRESS NOTE ADULT - ATTENDING COMMENTS
Patient evaluated at bedside, says she is feeling well and has no complaints, is ready to go home today. Vitals reviewed: /68, HR 77. Well appearing, NAD. Abdomen soft, non-tender, non-distended, fundus firm below umbilicus, lochia wnl. No calf tenderness b/l.  A/P: 31yo P5 now PP2 s/p /PPROM at 34w6d, uncomplicated. Patient is meeting all postpartum milestones and is stable for d/c home today. Patient to f/u out-patient with her primary ObGyn at [place] in 6 wks. Routine postpartum care.    Linda OCHOA  Ob Service Attending Patient evaluated at bedside, says she is feeling well and has no complaints, is ready to go home today. Vitals reviewed: /68, HR 77. Well appearing, NAD. Abdomen soft, non-tender, non-distended, fundus firm below umbilicus, lochia wnl. No calf tenderness b/l.  A/P: 31yo P5 now PP2 s/p /PPROM at 34w6d, uncomplicated. Patient had some mild range blood pressures postpartum, HELLP labs wnl and patient never ruled in for gHTN/PEC. Patient is meeting all postpartum milestones and is stable for d/c home today. Patient to f/u out-patient with her primary ObGyn at [place] in 6 wks. Routine postpartum care.    Linda OCHOA  Ob Service Attending

## 2023-05-14 NOTE — DISCHARGE NOTE OB - HOSPITAL COURSE
Patient had an uncomplicated  2/2 PPROM@34w6d followed by an uncomplicated postpartum course.    EBL: 102  Hct: 37.5->34.0    On Postpartum day 2, patient was discharged home in stable condition, voiding spontaneously and with normal vital signs. Pt was counseled on postpartum birth control and declined at this visit. Patient had an uncomplicated  2/2 PPROM@34w6d followed by an uncomplicated postpartum course.    EBL: 102  Hct: 37.5->34.0    Patient had some mild range blood pressures postpartum, HELLP labs wnl and patient never ruled in for gHTN/PEC. Patient is meeting all postpartum milestones and is stable for d/c home today. On Postpartum day 2, patient was discharged home in stable condition, voiding spontaneously and with normal vital signs. Patient was counseled on postpartum birth control and declined at this visit. Patient to f/u out-patient with her primary ObGyn at [place] in 6 wks.

## 2023-05-14 NOTE — DISCHARGE NOTE OB - MEDICATION SUMMARY - MEDICATIONS TO TAKE
I will START or STAY ON the medications listed below when I get home from the hospital:    ibuprofen 600 mg oral tablet  -- 1 tab(s) by mouth every 6 hours as needed for Pain  -- Indication: For Pain    acetaminophen 325 mg oral tablet  -- 3 tab(s) by mouth every 6 hours as needed for Pain  -- Indication: For Pain

## 2023-05-23 ENCOUNTER — APPOINTMENT (OUTPATIENT)
Dept: MATERNAL FETAL MEDICINE | Facility: CLINIC | Age: 33
End: 2023-05-23

## 2024-02-19 NOTE — OB RN PATIENT PROFILE - NS_EDDCALCULATED_OBGYN_ALL_OB
Cystoscopy today showed no abnormalities.  Consequently, no bladder biopsy was needed.      Call for worsening urinary symptoms and for flank pain.      Follow-up in November or December and obtain x-ray and ultrasound of kidneys prior in order to assess for any interval changes in stone burden.      Since you are taking Topamax, this does increase your risk for kidney stone formation.  Be sure to increase fluid intake so that you make at least 2 L of urine per day and minimize sodium intake.  
First Trimester Sonogram

## 2024-09-04 ENCOUNTER — APPOINTMENT (OUTPATIENT)
Dept: OBGYN | Facility: CLINIC | Age: 34
End: 2024-09-04